# Patient Record
Sex: MALE | Race: WHITE | NOT HISPANIC OR LATINO | Employment: FULL TIME | URBAN - METROPOLITAN AREA
[De-identification: names, ages, dates, MRNs, and addresses within clinical notes are randomized per-mention and may not be internally consistent; named-entity substitution may affect disease eponyms.]

---

## 2017-12-28 ENCOUNTER — OFFICE VISIT (OUTPATIENT)
Dept: URGENT CARE | Facility: URGENT CARE | Age: 27
End: 2017-12-28
Payer: COMMERCIAL

## 2017-12-28 VITALS
HEART RATE: 51 BPM | WEIGHT: 170 LBS | TEMPERATURE: 97.1 F | OXYGEN SATURATION: 100 % | BODY MASS INDEX: 24.34 KG/M2 | HEIGHT: 70 IN | DIASTOLIC BLOOD PRESSURE: 71 MMHG | SYSTOLIC BLOOD PRESSURE: 117 MMHG

## 2017-12-28 DIAGNOSIS — L60.0 INGROWN TOENAIL WITHOUT INFECTION: Primary | ICD-10-CM

## 2017-12-28 PROCEDURE — 99203 OFFICE O/P NEW LOW 30 MIN: CPT | Performed by: NURSE PRACTITIONER

## 2017-12-28 NOTE — MR AVS SNAPSHOT
After Visit Summary   12/28/2017    Craig Gusman    MRN: [id number]           Patient Information     Date Of Birth          1990        Visit Information        Provider Department      12/28/2017 6:15 PM Liliane Gonzalez NP [facility]        Today's Diagnoses     Ingrown toenail without infection    -  1      Care Instructions      Ingrown Toenail, Not Infected (Home Treatment)  An ingrown toenail occurs when the nail grows sideways into the skin next to the nail. This can cause pain, especially when wearing tight shoes. It can also lead to an infection with redness, swelling, and pus drainage. Most people respond to the treatments described here. But sometimes surgery is needed. The big toe is most often affected.   The most common cause of an ingrown toenail is trimming your nails wrong. Most people trim the nails too close to the skin and try to round the nail too tightly around the shape of the toe. When you do this, the nail can grow into the skin of your toe. It is safer to trim the nail ending in a straight line rather than a curve.  Home care  The following guidelines will help you care for your toenail at home:    Soak the painful toe in warm water 3 to 4 times each day, for 10 to 20 minutes each time. Adding Epsom salt may be recommended by your healthcare provider. Wash the entire foot with an antibacterial soap. Then keep it dry.    If there is redness or swelling around the toenail, apply an antibiotic ointment 3 times a day.    Insert a small piece of rolled-up cotton under the corner of the nail. This helps the nail to grow outward, away from the cuticle.    Wear shoes that don t put pressure on the toes, such as a sandal or open shoe. Closed shoes should be big enough in the toes so that there is no pressure on the painful toe.    You may use acetaminophen or ibuprofen for pain, unless another pain medicine was prescribed. Talk with your  healthcare provider before using these medicines if you have chronic liver or kidney disease. Also tell your provider if you have ever had a stomach ulcer or GI (gastrointestinal) bleeding.  Prevention  The following tips will help you prevent ingrown toenails:    Avoid pointed, tight, or narrow shoes.    Trim toenails once a month so they don t grow too long. Cut the nail straight across.  Follow-up care  Follow up with your healthcare provider, or as advised.  When to seek medical advice  Call your healthcare provider right away if any of these occur:    Increasing redness, pain, or swelling of the toe    Tender red streaks in the skin leading toward the ankle    Pus or fluid drainage from the toe    Fever of 100.4 F (38 C) or higher, or as directed by your provider  Date Last Reviewed: 4/1/2017 2000-2017 [employer]. [address] [place] [address]. All rights reserved. This information is not intended as a substitute for professional medical care. Always follow your healthcare professional's instructions.                Follow-ups after your visit        Who to contact     If you have questions or need follow up information about today's clinic visit or your schedule please contact [facility] directly at [phone].  Normal or non-critical lab and imaging results will be communicated to you by Accruithart, letter or phone within 4 business days after the clinic has received the results. If you do not hear from us within 7 days, please contact the clinic through [employer] or phone. If you have a critical or abnormal lab result, we will notify you by phone as soon as possible.  Submit refill requests through [employer]ueAgent or call your pharmacy and they will forward the refill request to us. Please allow 3 business days for your refill to be completed.          Additional Information About Your Visit        VenueAgent Information     VenueAgent lets you send messages to your  "doctor, view your test results, renew your prescriptions, schedule appointments and more. To sign up, go to [email or web address] . Click on \"Log in\" on the left side of the screen, which will take you to the Welcome page. Then click on \"Sign up Now\" on the right side of the page.     You will be asked to enter the access code listed below, as well as some personal information. Please follow the directions to create your username and password.     Your access code is: [email or web address]  Expires: 3/28/2018  6:38 PM     Your access code will  in 90 days. If you need help or a new code, please call your [facility] clinic or [phone].        Care EveryWhere ID     This is your Care EveryWhere ID. This could be used by other organizations to access your [facility] medical records  [id number]        Your Vitals Were     Pulse Temperature Height Pulse Oximetry BMI (Body Mass Index)       51 97.1  F (36.2  C) (Tympanic) 5' 10\" (1.778 m) 100% 24.39 kg/m2        Blood Pressure from Last 3 Encounters:   17 117/71    Weight from Last 3 Encounters:   17 170 lb (77.1 kg)              Today, you had the following     No orders found for display       Primary Care Provider Fax #    Physician No Ref-Primary [phone]       No address on file        Equal Access to Services     [facility]: Hadii savannah ku hadasho Soomaali, waaxda luqadaha, qaybta kaalmada adeegyada, jose ivey . So [facility] [phone].    ATENCIÓN: Si habla español, tiene a villa disposición servicios gratuitos de asistencia lingüística. Llame al [phone].    We comply with applicable federal civil rights laws and [place] laws. We do not discriminate on the basis of race, color, national origin, age, disability, sex, sexual orientation, or gender identity.            Thank you!     Thank you for choosing [facility]  for your care. Our goal is always to provide you with excellent care. Hearing " back from our patients is one way we can continue to improve our services. Please take a few minutes to complete the written survey that you may receive in the mail after your visit with us. Thank you!             Your Updated Medication List - Protect others around you: Learn how to safely use, store and throw away your medicines at www.disposemymeds.org.      Notice  As of 12/28/2017  6:38 PM    You have not been prescribed any medications.

## 2017-12-29 NOTE — PROGRESS NOTES
"  SUBJECTIVE:   Craig Gusman is a 27 year old male who presents to clinic today for the following health issues:    Ingrowing toenail      Duration: 2-3 weeks    Description (location/character/radiation): right great toe    Intensity:  moderate    Accompanying signs and symptoms: pain, swelling, redness    History (similar episodes/previous evaluation): None    Precipitating or alleviating factors: hurts to wear shoes and walking    Therapies tried and outcome: has improved with applying Neosporin, but still a little red and painful. No drainage.       Problem list and histories reviewed & adjusted, as indicated.  Additional history: as documented    There is no problem list on file for this patient.    History reviewed. No pertinent surgical history.    Social History   Substance Use Topics     Smoking status: Never Smoker     Smokeless tobacco: Never Used     Alcohol use Not on file     History reviewed. No pertinent family history.      No current outpatient prescriptions on file.     No Known Allergies      Reviewed and updated as needed this visit by clinical staffTobacco  Allergies  Meds  Soc Hx      Reviewed and updated as needed this visit by Provider  Tobacco  Allergies  Meds  Med Hx  Surg Hx  Fam Hx  Soc Hx        ROS:  Constitutional, HEENT, cardiovascular, pulmonary, gi and gu systems are negative, except as otherwise noted.      OBJECTIVE:   /71  Pulse 51  Temp 97.1  F (36.2  C) (Tympanic)  Ht 5' 10\" (1.778 m)  Wt 170 lb (77.1 kg)  SpO2 100%  BMI 24.39 kg/m2  Body mass index is 24.39 kg/(m^2).  Physical Exam   Constitutional: No distress.   Musculoskeletal:        Feet:    Psychiatric: He has a normal mood and affect. His behavior is normal.         Diagnostic Test Results:  none     ASSESSMENT/PLAN:     ASSESSMENT:  Ingrown nail    PLAN:  Soak tid.  Bacitracin to affected area tid.  If any worsening sx, fever, etc. should be seen.  Allow nail to grow out and cut straight " across.    Liliane Gonzalez, MALLORY  [facility]

## 2017-12-29 NOTE — PATIENT INSTRUCTIONS
Ingrown Toenail, Not Infected (Home Treatment)  An ingrown toenail occurs when the nail grows sideways into the skin next to the nail. This can cause pain, especially when wearing tight shoes. It can also lead to an infection with redness, swelling, and pus drainage. Most people respond to the treatments described here. But sometimes surgery is needed. The big toe is most often affected.   The most common cause of an ingrown toenail is trimming your nails wrong. Most people trim the nails too close to the skin and try to round the nail too tightly around the shape of the toe. When you do this, the nail can grow into the skin of your toe. It is safer to trim the nail ending in a straight line rather than a curve.  Home care  The following guidelines will help you care for your toenail at home:    Soak the painful toe in warm water 3 to 4 times each day, for 10 to 20 minutes each time. Adding Epsom salt may be recommended by your healthcare provider. Wash the entire foot with an antibacterial soap. Then keep it dry.    If there is redness or swelling around the toenail, apply an antibiotic ointment 3 times a day.    Insert a small piece of rolled-up cotton under the corner of the nail. This helps the nail to grow outward, away from the cuticle.    Wear shoes that don t put pressure on the toes, such as a sandal or open shoe. Closed shoes should be big enough in the toes so that there is no pressure on the painful toe.    You may use acetaminophen or ibuprofen for pain, unless another pain medicine was prescribed. Talk with your healthcare provider before using these medicines if you have chronic liver or kidney disease. Also tell your provider if you have ever had a stomach ulcer or GI (gastrointestinal) bleeding.  Prevention  The following tips will help you prevent ingrown toenails:    Avoid pointed, tight, or narrow shoes.    Trim toenails once a month so they don t grow too long. Cut the nail straight  across.  Follow-up care  Follow up with your healthcare provider, or as advised.  When to seek medical advice  Call your healthcare provider right away if any of these occur:    Increasing redness, pain, or swelling of the toe    Tender red streaks in the skin leading toward the ankle    Pus or fluid drainage from the toe    Fever of 100.4 F (38 C) or higher, or as directed by your provider  Date Last Reviewed: 4/1/2017 2000-2017 [employer]. [address] [place], [place] [address]. All rights reserved. This information is not intended as a substitute for professional medical care. Always follow your healthcare professional's instructions.

## 2017-12-29 NOTE — NURSING NOTE
"Chief Complaint   Patient presents with     Urgent Care     Ingrown Toenail     c/o ingrown toenail for 2 weeks       Initial /71  Pulse 51  Temp 97.1  F (36.2  C) (Tympanic)  Ht 5' 10\" (1.778 m)  Wt 170 lb (77.1 kg)  SpO2 100%  BMI 24.39 kg/m2 Estimated body mass index is 24.39 kg/(m^2) as calculated from the following:    Height as of this encounter: 5' 10\" (1.778 m).    Weight as of this encounter: 170 lb (77.1 kg).  Medication Reconciliation: complete   Shanda Etienne MA    "

## 2019-05-15 ENCOUNTER — OFFICE VISIT (OUTPATIENT)
Dept: FAMILY MEDICINE | Facility: CLINIC | Age: 29
End: 2019-05-15
Payer: COMMERCIAL

## 2019-05-15 VITALS
BODY MASS INDEX: 23.19 KG/M2 | OXYGEN SATURATION: 99 % | DIASTOLIC BLOOD PRESSURE: 77 MMHG | SYSTOLIC BLOOD PRESSURE: 122 MMHG | TEMPERATURE: 98.4 F | HEIGHT: 70 IN | HEART RATE: 47 BPM | RESPIRATION RATE: 20 BRPM | WEIGHT: 162 LBS

## 2019-05-15 DIAGNOSIS — M23.8X1 CREPITUS OF JOINT OF RIGHT KNEE: ICD-10-CM

## 2019-05-15 DIAGNOSIS — R41.840 ATTENTION AND CONCENTRATION DEFICIT: Primary | ICD-10-CM

## 2019-05-15 PROCEDURE — 99213 OFFICE O/P EST LOW 20 MIN: CPT | Performed by: FAMILY MEDICINE

## 2019-05-15 RX ORDER — LORATADINE 10 MG/1
10 TABLET ORAL DAILY PRN
COMMUNITY

## 2019-05-15 ASSESSMENT — ANXIETY QUESTIONNAIRES
1. FEELING NERVOUS, ANXIOUS, OR ON EDGE: SEVERAL DAYS
GAD7 TOTAL SCORE: 9
6. BECOMING EASILY ANNOYED OR IRRITABLE: SEVERAL DAYS
5. BEING SO RESTLESS THAT IT IS HARD TO SIT STILL: NEARLY EVERY DAY
7. FEELING AFRAID AS IF SOMETHING AWFUL MIGHT HAPPEN: SEVERAL DAYS
2. NOT BEING ABLE TO STOP OR CONTROL WORRYING: SEVERAL DAYS
3. WORRYING TOO MUCH ABOUT DIFFERENT THINGS: SEVERAL DAYS

## 2019-05-15 ASSESSMENT — MIFFLIN-ST. JEOR: SCORE: 1711.08

## 2019-05-15 ASSESSMENT — PATIENT HEALTH QUESTIONNAIRE - PHQ9
SUM OF ALL RESPONSES TO PHQ QUESTIONS 1-9: 12
5. POOR APPETITE OR OVEREATING: SEVERAL DAYS

## 2019-05-15 NOTE — PROGRESS NOTES
SUBJECTIVE:   Craig Gusman is a 28 year old male who presents to clinic today for the following health issues:      HPI     Presents to [facility] care.    Current Chronic Medical Conditions  None    Social History  [employer]   Works as -- [employer].  [employer].  Recently .  Wife is a [employer] .  No kids.    Would like further assessment for possible ADHD/ADD symptoms.  Never was tested as kid but people would sometimes joke with him.  Cannot focus to read more than 2-3 pages at a time.  Sometimes talks too fast and interrupts others when speaking.  Feels he cannot focus on tasks at hand.    He also is concerned about crunching noise in RIGHT knee with certain movements.  No pain associated with sound.  No restriction, locking or popping of knee.  No swelling.      Additional history: as documented    Reviewed and updated as needed this visit by clinical staff  Tobacco  Allergies  Meds  Problems  Med Hx  Surg Hx  Fam Hx  Soc Hx          Reviewed and updated as needed this visit by Provider  Tobacco  Allergies  Meds  Problems  Med Hx  Surg Hx  Fam Hx             There is no problem list on file for this patient.    History reviewed. No pertinent surgical history.    Social History     Tobacco Use     Smoking status: Never Smoker     Smokeless tobacco: Current User     Types: Chew   Substance Use Topics     Alcohol use: Yes     Comment: couple beers per week     Family History   Problem Relation Age of Onset     Hyperlipidemia Father          Current Outpatient Medications   Medication Sig Dispense Refill     loratadine (CLARITIN) 10 MG tablet Take 10 mg by mouth daily as needed for allergies       No Known Allergies  No lab results found.   BP Readings from Last 3 Encounters:   05/15/19 122/77   12/28/17 117/71    Wt Readings from Last 3 Encounters:   05/15/19 73.5 kg (162 lb)   12/28/17 77.1 kg (170 lb)                    ROS:  Constitutional, HEENT,  "cardiovascular, pulmonary, gi and gu systems are negative, except as otherwise noted.    OBJECTIVE:     /77   Pulse (!) 47   Temp 98.4  F (36.9  C) (Oral)   Resp 20   Ht 1.778 m (5' 10\")   Wt 73.5 kg (162 lb)   SpO2 99%   BMI 23.24 kg/m    Body mass index is 23.24 kg/m .  GENERAL: healthy, alert and no distress  EYES: Eyes grossly normal to inspection, PERRL and conjunctivae and sclerae normal  NECK: no adenopathy, no asymmetry, masses, or scars and thyroid normal to palpation  MS: no gross musculoskeletal defects noted, no edema, + crepitus with squats of RIGHT knee only, structurally normal  SKIN: no suspicious lesions or rashes  NEURO: Normal strength and tone, mentation intact and speech normal  PSYCH: mentation appears normal, affect normal/bright    ASSESSMENT/PLAN:     1. Attention and concentration deficit    Referred to [facility] or [facility] for adult ADHD assessment.  Once this is completed, he is encouraged to return to discuss treatment options if he does qualify for diagnosis.    2. Crepitus of joint of right knee    Reassured of benign nature of crepitus when it is not associated with pain or ROM issues.  No restrictions for exercise.      Leny Aguilar MD  [facility]  "

## 2019-05-16 ASSESSMENT — ANXIETY QUESTIONNAIRES: GAD7 TOTAL SCORE: 9

## 2019-05-31 ENCOUNTER — OFFICE VISIT (OUTPATIENT)
Dept: URGENT CARE | Facility: URGENT CARE | Age: 29
End: 2019-05-31
Payer: COMMERCIAL

## 2019-05-31 VITALS
HEART RATE: 50 BPM | BODY MASS INDEX: 22.9 KG/M2 | DIASTOLIC BLOOD PRESSURE: 70 MMHG | TEMPERATURE: 98.2 F | HEIGHT: 70 IN | WEIGHT: 160 LBS | SYSTOLIC BLOOD PRESSURE: 110 MMHG | RESPIRATION RATE: 12 BRPM

## 2019-05-31 DIAGNOSIS — R21 RASH AND NONSPECIFIC SKIN ERUPTION: Primary | ICD-10-CM

## 2019-05-31 DIAGNOSIS — L23.7 CONTACT DERMATITIS DUE TO POISON IVY: ICD-10-CM

## 2019-05-31 PROCEDURE — 99213 OFFICE O/P EST LOW 20 MIN: CPT | Performed by: INTERNAL MEDICINE

## 2019-05-31 RX ORDER — PREDNISONE 20 MG/1
TABLET ORAL
Qty: 20 TABLET | Refills: 0 | Status: SHIPPED | OUTPATIENT
Start: 2019-05-31 | End: 2020-10-20

## 2019-05-31 ASSESSMENT — MIFFLIN-ST. JEOR: SCORE: 1702.01

## 2019-05-31 NOTE — PROGRESS NOTES
"SUBJECTIVE:   Craig Gusman is a 28 year old male presenting with a chief complaint of   Chief Complaint   Patient presents with     Urgent Care     Derm Problem     Rash on arms legs and feet. itchy       He is an established patient of [facility].    Rash    Onset of rash was 5 day(s) ago.   Course of illness is worsening.    Current and Associated symptoms: itching and skin color bumps   Location of the rash: arm, lower and lower legs/feet.  Previous history of a similar rash? No  Recent exposure history: tubing in [place]  Denies exposure to: dietary change, environmental allergens, medications, new household products and new skincare products  Associated symptoms include: nothing.  Treatment measures tried include: caladryl        Review of Systems    No past medical history on file.  Family History   Problem Relation Age of Onset     Hyperlipidemia Father      Current Outpatient Medications   Medication Sig Dispense Refill     predniSONE (DELTASONE) 20 MG tablet Take 3 tabs by mouth daily x 3 days, then 2 tabs daily x 3 days, then 1 tab daily x 3 days, then 1/2 tab daily x 3 days. 20 tablet 0     loratadine (CLARITIN) 10 MG tablet Take 10 mg by mouth daily as needed for allergies       Social History     Tobacco Use     Smoking status: Never Smoker     Smokeless tobacco: Current User     Types: Chew   Substance Use Topics     Alcohol use: Yes     Comment: couple beers per week       OBJECTIVE  /70   Pulse 50   Temp 98.2  F (36.8  C) (Oral)   Resp 12   Ht 1.778 m (5' 10\")   Wt 72.6 kg (160 lb)   BMI 22.96 kg/m      Physical Exam   Constitutional: He appears well-developed and well-nourished.   Skin:   Left upper extremity patient hasRed papules some in linear distribution with potentially early blister development    Bilateral feet with skin colored papules.  Some seem to be developing a few blisters.    With closer examination of rash on calves there are red papules with some early blister with " linear involvement.    Also scabbed areas from excoriations from itching.     Vitals reviewed.      Labs:  No results found for this or any previous visit (from the past 24 hour(s)).        ASSESSMENT:      ICD-10-CM    1. Rash and nonspecific skin eruption R21 predniSONE (DELTASONE) 20 MG tablet   2. Contact dermatitis due to poison ivy L23.7 predniSONE (DELTASONE) 20 MG tablet        Medical Decision Making:  Most likely exposure was during to being down a river in [place].  Rash reminiscent of contact dermatitis due to plant like poison ivy.    Potentially could be bug bites although there are a lot of papules so doubt that.  Potentially something else like triggers.    Treatment antihistamines and steroid taper.  Handout given below for also other comfort cares      Patient Instructions     Rash   Could be poison ivy - as linear in some places & starting to blister.  Bug bites -like chiggers.    Antihistamine  claritin morning  Benadryl 50 mg night  Prednisone taper.          Patient Education     Avoiding Poison Ivy, Poison Oak, and Poison Sumac  Poison oak, poison ivy, and poison sumac are plants that can cause skin rashes. The problem is a sap oil called urushiol that is contained in these plants. If you're allergic to urushiol, touching one of these plants may cause your skin to react. Within hours or days, you may have a red, swollen, itchy rash. You can't stop the rash. But you can soothe the itching.        Recognizing these plants  You can help prevent a poison oak, poison ivy, or poison sumac rash. Know what these plants look like. And then avoid them. They grow in the form of bong, small plants, and large bushes. In most cases, poison oak and poison ivy have 3 leaves per stem. Poison sumac has from 7 leaves to 13 leaves per stem. Watch out for these plants when you go to any outdoor area, from a friend's overgrown back yard to the wilderness. Urushiol is present in these plants all year round, even when  the leaves are gone. So always be on the lookout.  What causes a reaction?  Poison oak, poison ivy, and poison sumac thrive mainly in unmaintained outdoor areas. If you touch these plants, you may get a rash. You may also react if you touch something that came in contact with urushiol. This could be a dog or cat, clothing, or equipment. But the rash caused by these plants is not contagious.  Steps to prevention  When heading outdoors, take these preventive steps:    Avoid touching any of these plants.    Wear long pants and a long-sleeved shirt.    If you're going to a heavily wooded or brushy area, also put on gloves, a hat, and boots.    If you are very sensitive, apply bentoquatam 5% topical cream to all exposed areas of your skin. This creates a layer of protection between your skin and any sap oil you may touch.    If you come in contact with these plants or the oil, wash with soap and water as soon as possible.    Wash clothing and animals that come in contact with these plants as well. Urushiol may stay on them and cause a rash when you touch them in the future.  Date Last Reviewed: 3/1/2017    5230-6538 [employer]. [address] [place] [address], [place] [address]. All rights reserved. This information is not intended as a substitute for professional medical care. Always follow your healthcare professional's instructions.           Patient Education     Managing a Poison Ivy, Poison Oak, or Poison Sumac Reaction  If you come in contact with urushiol    If you think you may have come in contact with the sap oil (called urushiol) contained in poison ivy, poison oak, or poison sumac plants, wash the affected part of your skin. Do this within 15 minutes after contact. Use water or preferably, soap and water.  Undress, and wash your clothes and gear as soon as you can. Be sure to wash any pet that was with you. Taking these steps can help prevent spreading sap oil to someone else. If you have a rash, but  are not sure if it is from one of these plants, see your healthcare provider.  To soothe the itching  Your skin may react to poison oak, poison ivy, and poison sumac within hours to a few days after contact. Once you have come into contact with these plants, you can t stop the reaction. But you can take these steps to soothe the itching:    Don t scratch or scrub your rash. Not even if the itching is severe. Scratching can lead to infection.    Bathe in lukewarm (not hot) water. Or take short cool showers to relieve the itching. For a more soothing bath, add oatmeal to the water.    Use antihistamines that are taken by mouth. These include diphenhydramine. You can buy these at the pharmacy. Talk to your healthcare provider or pharmacist for more information on oral antihistamines.    Use over-the-counter treatments on your skin. These include cortisone and calamine lotion.  How your skin may react  A mild rash may become red, swollen, and itchy. The rash may form a line on your skin where you brushed against the plant. If you have a severe rash, your itching may worsen. And your rash may blister and ooze. If this happens, seek medical care. The fluid from your blisters will not make your rash spread. With or without medical care, your rash may last up to 3 weeks. In the future, try to avoid coming in contact with these plants.  When to call your healthcare provider  Call your healthcare provider if:    Your rash is severe    The rash spreads beyond the exposed part of your body or affects your face.    The rash does not clear up within a few weeks  You may be given medicine to take by mouth or apply directly on the skin.     Call 911  Call 911 if you have any of the following:    Trouble breathing or swallowing    Any significant swelling   Date Last Reviewed: 3/1/2017    9864-3485 [employer]. [address] [place] [address], [place] [address]. All rights reserved. This information is not intended as a  substitute for professional medical care. Always follow your healthcare professional's instructions.

## 2019-05-31 NOTE — PATIENT INSTRUCTIONS
Rash   Could be poison ivy - as linear in some places & starting to blister.  Bug bites -like chiggers.    Antihistamine  claritin morning  Benadryl 50 mg night  Prednisone taper.          Patient Education     Avoiding Poison Ivy, Poison Oak, and Poison Sumac  Poison oak, poison ivy, and poison sumac are plants that can cause skin rashes. The problem is a sap oil called urushiol that is contained in these plants. If you're allergic to urushiol, touching one of these plants may cause your skin to react. Within hours or days, you may have a red, swollen, itchy rash. You can't stop the rash. But you can soothe the itching.        Recognizing these plants  You can help prevent a poison oak, poison ivy, or poison sumac rash. Know what these plants look like. And then avoid them. They grow in the form of bong, small plants, and large bushes. In most cases, poison oak and poison ivy have 3 leaves per stem. Poison sumac has from 7 leaves to 13 leaves per stem. Watch out for these plants when you go to any outdoor area, from a friend's overgrown back yard to the [facility]. Urushiol is present in these plants all year round, even when the leaves are gone. So always be on the lookout.  What causes a reaction?  Poison oak, poison ivy, and poison sumac thrive mainly in unmaintained outdoor areas. If you touch these plants, you may get a rash. You may also react if you touch something that came in contact with urushiol. This could be a dog or cat, clothing, or equipment. But the rash caused by these plants is not contagious.  Steps to prevention  When heading outdoors, take these preventive steps:    Avoid touching any of these plants.    Wear long pants and a long-sleeved shirt.    If you're going to a heavily wooded or brushy area, also put on gloves, a hat, and boots.    If you are very sensitive, apply bentoquatam 5% topical cream to all exposed areas of your skin. This creates a layer of protection between your skin and  any sap oil you may touch.    If you come in contact with these plants or the oil, wash with soap and water as soon as possible.    Wash clothing and animals that come in contact with these plants as well. Urushiol may stay on them and cause a rash when you touch them in the future.  Date Last Reviewed: 3/1/2017    2233-6699 [employer]. [address] [place] [address], [place] [address]. All rights reserved. This information is not intended as a substitute for professional medical care. Always follow your healthcare professional's instructions.           Patient Education     Managing a Poison Ivy, Poison Oak, or Poison Sumac Reaction  If you come in contact with urushiol    If you think you may have come in contact with the sap oil (called urushiol) contained in poison ivy, poison oak, or poison sumac plants, wash the affected part of your skin. Do this within 15 minutes after contact. Use water or preferably, soap and water.  Undress, and wash your clothes and gear as soon as you can. Be sure to wash any pet that was with you. Taking these steps can help prevent spreading sap oil to someone else. If you have a rash, but are not sure if it is from one of these plants, see your healthcare provider.  To soothe the itching  Your skin may react to poison oak, poison ivy, and poison sumac within hours to a few days after contact. Once you have come into contact with these plants, you can t stop the reaction. But you can take these steps to soothe the itching:    Don t scratch or scrub your rash. Not even if the itching is severe. Scratching can lead to infection.    Bathe in lukewarm (not hot) water. Or take short cool showers to relieve the itching. For a more soothing bath, add oatmeal to the water.    Use antihistamines that are taken by mouth. These include diphenhydramine. You can buy these at the pharmacy. Talk to your healthcare provider or pharmacist for more information on oral antihistamines.    Use  over-the-counter treatments on your skin. These include cortisone and calamine lotion.  How your skin may react  A mild rash may become red, swollen, and itchy. The rash may form a line on your skin where you brushed against the plant. If you have a severe rash, your itching may worsen. And your rash may blister and ooze. If this happens, seek medical care. The fluid from your blisters will not make your rash spread. With or without medical care, your rash may last up to 3 weeks. In the future, try to avoid coming in contact with these plants.  When to call your healthcare provider  Call your healthcare provider if:    Your rash is severe    The rash spreads beyond the exposed part of your body or affects your face.    The rash does not clear up within a few weeks  You may be given medicine to take by mouth or apply directly on the skin.     Call 911  Call 911 if you have any of the following:    Trouble breathing or swallowing    Any significant swelling   Date Last Reviewed: 3/1/2017    4275-4555 [employer] [address] [place] [address], [place] [address]. All rights reserved. This information is not intended as a substitute for professional medical care. Always follow your healthcare professional's instructions.

## 2020-03-11 ENCOUNTER — HEALTH MAINTENANCE LETTER (OUTPATIENT)
Age: 30
End: 2020-03-11

## 2020-10-19 ASSESSMENT — ENCOUNTER SYMPTOMS
ABDOMINAL PAIN: 0
ARTHRALGIAS: 0
CHILLS: 0
FEVER: 0
WEAKNESS: 0
SORE THROAT: 0
HEMATURIA: 0
FREQUENCY: 0
HEARTBURN: 0
MYALGIAS: 0
COUGH: 0
DYSURIA: 0
CONSTIPATION: 0
PALPITATIONS: 0
NERVOUS/ANXIOUS: 1
HEADACHES: 0
HEMATOCHEZIA: 0
JOINT SWELLING: 0
PARESTHESIAS: 0
NAUSEA: 0
SHORTNESS OF BREATH: 0
DIZZINESS: 0
DIARRHEA: 0
EYE PAIN: 0

## 2020-10-20 ENCOUNTER — OFFICE VISIT (OUTPATIENT)
Dept: FAMILY MEDICINE | Facility: CLINIC | Age: 30
End: 2020-10-20
Payer: COMMERCIAL

## 2020-10-20 VITALS
BODY MASS INDEX: 20.9 KG/M2 | RESPIRATION RATE: 16 BRPM | HEIGHT: 70 IN | SYSTOLIC BLOOD PRESSURE: 112 MMHG | TEMPERATURE: 98.4 F | HEART RATE: 100 BPM | DIASTOLIC BLOOD PRESSURE: 60 MMHG | WEIGHT: 146 LBS | OXYGEN SATURATION: 100 %

## 2020-10-20 DIAGNOSIS — Z00.00 ROUTINE GENERAL MEDICAL EXAMINATION AT A HEALTH CARE FACILITY: Primary | ICD-10-CM

## 2020-10-20 DIAGNOSIS — Z13.1 SCREENING FOR DIABETES MELLITUS: ICD-10-CM

## 2020-10-20 DIAGNOSIS — Z13.6 SCREENING FOR CARDIOVASCULAR CONDITION: ICD-10-CM

## 2020-10-20 DIAGNOSIS — Z11.4 SCREENING FOR HIV (HUMAN IMMUNODEFICIENCY VIRUS): ICD-10-CM

## 2020-10-20 DIAGNOSIS — Z23 NEED FOR PROPHYLACTIC VACCINATION AND INOCULATION AGAINST INFLUENZA: ICD-10-CM

## 2020-10-20 DIAGNOSIS — Z72.0 CHEWS TOBACCO: ICD-10-CM

## 2020-10-20 LAB — HIV 1+2 AB+HIV1 P24 AG SERPL QL IA: NONREACTIVE

## 2020-10-20 PROCEDURE — 99395 PREV VISIT EST AGE 18-39: CPT | Mod: 25 | Performed by: FAMILY MEDICINE

## 2020-10-20 PROCEDURE — 82947 ASSAY GLUCOSE BLOOD QUANT: CPT | Performed by: FAMILY MEDICINE

## 2020-10-20 PROCEDURE — 87389 HIV-1 AG W/HIV-1&-2 AB AG IA: CPT | Performed by: FAMILY MEDICINE

## 2020-10-20 PROCEDURE — 36415 COLL VENOUS BLD VENIPUNCTURE: CPT | Performed by: FAMILY MEDICINE

## 2020-10-20 PROCEDURE — 90471 IMMUNIZATION ADMIN: CPT | Performed by: FAMILY MEDICINE

## 2020-10-20 PROCEDURE — 80061 LIPID PANEL: CPT | Performed by: FAMILY MEDICINE

## 2020-10-20 PROCEDURE — 90686 IIV4 VACC NO PRSV 0.5 ML IM: CPT | Performed by: FAMILY MEDICINE

## 2020-10-20 RX ORDER — DEXTROAMPHETAMINE SACCHARATE, AMPHETAMINE ASPARTATE MONOHYDRATE, DEXTROAMPHETAMINE SULFATE AND AMPHETAMINE SULFATE 3.75; 3.75; 3.75; 3.75 MG/1; MG/1; MG/1; MG/1
25 CAPSULE, EXTENDED RELEASE ORAL DAILY
COMMUNITY
Start: 2020-10-19

## 2020-10-20 ASSESSMENT — ENCOUNTER SYMPTOMS
FEVER: 0
NAUSEA: 0
NERVOUS/ANXIOUS: 1
HEADACHES: 0
PALPITATIONS: 0
EYE PAIN: 0
SORE THROAT: 0
MYALGIAS: 0
SHORTNESS OF BREATH: 0
ARTHRALGIAS: 0
DYSURIA: 0
DIZZINESS: 0
ABDOMINAL PAIN: 0
JOINT SWELLING: 0
HEARTBURN: 0
HEMATOCHEZIA: 0
PARESTHESIAS: 0
CONSTIPATION: 0
HEMATURIA: 0
CHILLS: 0
DIARRHEA: 0
FREQUENCY: 0
COUGH: 0
WEAKNESS: 0

## 2020-10-20 ASSESSMENT — ANXIETY QUESTIONNAIRES
7. FEELING AFRAID AS IF SOMETHING AWFUL MIGHT HAPPEN: SEVERAL DAYS
GAD7 TOTAL SCORE: 9
3. WORRYING TOO MUCH ABOUT DIFFERENT THINGS: MORE THAN HALF THE DAYS
5. BEING SO RESTLESS THAT IT IS HARD TO SIT STILL: NOT AT ALL
GAD7 TOTAL SCORE: 9
6. BECOMING EASILY ANNOYED OR IRRITABLE: MORE THAN HALF THE DAYS
2. NOT BEING ABLE TO STOP OR CONTROL WORRYING: SEVERAL DAYS
7. FEELING AFRAID AS IF SOMETHING AWFUL MIGHT HAPPEN: SEVERAL DAYS
1. FEELING NERVOUS, ANXIOUS, OR ON EDGE: MORE THAN HALF THE DAYS
GAD7 TOTAL SCORE: 9
4. TROUBLE RELAXING: SEVERAL DAYS

## 2020-10-20 ASSESSMENT — PATIENT HEALTH QUESTIONNAIRE - PHQ9
10. IF YOU CHECKED OFF ANY PROBLEMS, HOW DIFFICULT HAVE THESE PROBLEMS MADE IT FOR YOU TO DO YOUR WORK, TAKE CARE OF THINGS AT HOME, OR GET ALONG WITH OTHER PEOPLE: SOMEWHAT DIFFICULT
SUM OF ALL RESPONSES TO PHQ QUESTIONS 1-9: 13
SUM OF ALL RESPONSES TO PHQ QUESTIONS 1-9: 13

## 2020-10-20 ASSESSMENT — MIFFLIN-ST. JEOR: SCORE: 1628.5

## 2020-10-20 NOTE — PROGRESS NOTES
SUBJECTIVE:   CC: Craig Gusman is an 30 year old male who presents for preventative health visit.       Patient has been advised of split billing requirements and indicates understanding: No  Healthy Habits:     Getting at least 3 servings of Calcium per day:  NO    Bi-annual eye exam:  NO    Dental care twice a year:  NO    Sleep apnea or symptoms of sleep apnea:  None    Diet:  Regular (no restrictions)    Frequency of exercise:  1 day/week    Duration of exercise:  15-30 minutes    Taking medications regularly:  Yes    Medication side effects:  Other    PHQ-2 Total Score: 2    Additional concerns today:  Yes (check out mole on left arm )    Today's PHQ-2 Score:   PHQ-2 ( 1999 Pfizer) 10/19/2020   Q1: Little interest or pleasure in doing things 1   Q2: Feeling down, depressed or hopeless 1   PHQ-2 Score 2   Q1: Little interest or pleasure in doing things Several days   Q2: Feeling down, depressed or hopeless Several days   PHQ-2 Score 2     Abuse: Current or Past(Physical, Sexual or Emotional)- No  Do you feel safe in your environment? Yes    Social History     Tobacco Use     Smoking status: Never Smoker     Smokeless tobacco: Current User     Types: Chew   Substance Use Topics     Alcohol use: Yes     Comment: couple beers per week     If you drink alcohol do you typically have >3 drinks per day or >7 drinks per week? No    Alcohol Use 10/19/2020   Prescreen: >3 drinks/day or >7 drinks/week? No   No flowsheet data found.    Last PSA: No results found for: PSA    Reviewed orders with patient. Reviewed health maintenance and updated orders accordingly - Yes       Reviewed and updated as needed this visit by clinical staff    Reviewed and updated as needed this visit by Provider    Does not smoke but chew tobacco.     No suicidal thoughts but passive thoughts present.  Recently become unemployed.   Looking for Counsellor. No need for intervention at this point.   Started 15mg of adderall last year. Seeing a  "psychiatrist.   Lives with his wife who is studying to be a counselor.   Biking 2-3 times per week. Not as active.     Review of Systems   Constitutional: Negative for chills and fever.   HENT: Negative for congestion, ear pain, hearing loss and sore throat.    Eyes: Negative for pain and visual disturbance.   Respiratory: Negative for cough and shortness of breath.    Cardiovascular: Negative for chest pain, palpitations and peripheral edema.   Gastrointestinal: Negative for abdominal pain, constipation, diarrhea, heartburn, hematochezia and nausea.   Genitourinary: Negative for discharge, dysuria, frequency, genital sores, hematuria, impotence and urgency.   Musculoskeletal: Negative for arthralgias, joint swelling and myalgias.   Skin: Negative for rash.   Neurological: Negative for dizziness, weakness, headaches and paresthesias.   Psychiatric/Behavioral: Negative for mood changes. The patient is nervous/anxious.      OBJECTIVE:   /60 (BP Location: Left arm, Patient Position: Sitting, Cuff Size: Adult Regular)   Pulse 100   Temp 98.4  F (36.9  C) (Oral)   Resp 16   Ht 1.778 m (5' 10\")   Wt 66.2 kg (146 lb)   SpO2 100%   BMI 20.95 kg/m      Physical Exam  GENERAL: healthy, alert and no distress  EYES: Eyes grossly normal to inspection, PERRL and conjunctivae and sclerae normal  HENT: ear canals and TM's normal, nose and mouth without ulcers or lesions  NECK: no adenopathy, no asymmetry, masses, or scars and thyroid normal to palpation  RESP: lungs clear to auscultation - no rales, rhonchi or wheezes  CV: regular rate and rhythm, normal S1 S2, no S3 or S4, no murmur, click or rub, no peripheral edema and peripheral pulses strong  ABDOMEN: soft, nontender, no hepatosplenomegaly, no masses and bowel sounds normal   (male): normal male genitalia without lesions or urethral discharge, no hernia  MS: no gross musculoskeletal defects noted, no edema  SKIN: no suspicious lesions or rashes, small hemangioma " "on left deltoid area  NEURO: Normal strength and tone, mentation intact and speech normal  PSYCH: mentation appears normal, affect normal/bright    ASSESSMENT/PLAN:   1. Routine general medical examination at a health care facility  Patient's PHQ-9 score is high with passive suicidal ideation (better of dead thoughts on and off).  He is seeing a psychiatrist and he is going to talk to psychiatrist about medication.  Currently on ADHD medication but not on depression meds.  He denies self-harm thoughts, plan or intention.  He feels he is very stable and not interested in any intervention.  He feels comfortable.     2. Need for prophylactic vaccination and inoculation against influenza     - INFLUENZA VACCINE IM > 6 MONTHS VALENT IIV4 [30848]  - Vaccine Administration, Initial [65297]    3. Screening for HIV (human immunodeficiency virus)     - HIV Antigen Antibody Combo    4. Screening for diabetes mellitus     - Glucose    5. Screening for cardiovascular condition     - Lipid panel reflex to direct LDL Non-fasting    Patient has been advised of split billing requirements and indicates understanding: No  COUNSELING:   Reviewed preventive health counseling, as reflected in patient instructions  Special attention given to:        Regular exercise       Healthy diet/nutrition       Vision screening       Hearing screening    Estimated body mass index is 22.96 kg/m  as calculated from the following:    Height as of 5/31/19: 1.778 m (5' 10\").    Weight as of 5/31/19: 72.6 kg (160 lb).         He reports that he has never smoked. His smokeless tobacco use includes chew.  Tobacco Cessation Action Plan:   Self help information given to patient      Counseling Resources:  ATP IV Guidelines  Pooled Cohorts Equation Calculator  FRAX Risk Assessment  ICSI Preventive Guidelines  Dietary Guidelines for Americans, 2010  [employer]s MyPlate  ASA Prophylaxis  Lung CA Screening    Marshall Caldwell MD, MD  [facility] " EL LOMBARDI  Answers for HPI/ROS submitted by the patient on 10/20/2020   Annual Exam:  If you checked off any problems, how difficult have these problems made it for you to do your work, take care of things at home, or get along with other people?: Somewhat difficult  PHQ9 TOTAL SCORE: 13  MALVIN 7 TOTAL SCORE: 9

## 2020-10-21 LAB
CHOLEST SERPL-MCNC: 146 MG/DL
GLUCOSE SERPL-MCNC: 91 MG/DL (ref 70–99)
HDLC SERPL-MCNC: 47 MG/DL
LDLC SERPL CALC-MCNC: 70 MG/DL
NONHDLC SERPL-MCNC: 99 MG/DL
TRIGL SERPL-MCNC: 147 MG/DL

## 2020-10-21 ASSESSMENT — PATIENT HEALTH QUESTIONNAIRE - PHQ9: SUM OF ALL RESPONSES TO PHQ QUESTIONS 1-9: 13

## 2020-10-21 ASSESSMENT — ANXIETY QUESTIONNAIRES: GAD7 TOTAL SCORE: 9

## 2021-09-07 ENCOUNTER — OFFICE VISIT (OUTPATIENT)
Dept: FAMILY MEDICINE | Facility: CLINIC | Age: 31
End: 2021-09-07
Payer: COMMERCIAL

## 2021-09-07 VITALS
SYSTOLIC BLOOD PRESSURE: 120 MMHG | WEIGHT: 156 LBS | OXYGEN SATURATION: 100 % | BODY MASS INDEX: 21.84 KG/M2 | TEMPERATURE: 97.5 F | HEART RATE: 60 BPM | HEIGHT: 71 IN | DIASTOLIC BLOOD PRESSURE: 82 MMHG

## 2021-09-07 DIAGNOSIS — F90.2 ATTENTION DEFICIT HYPERACTIVITY DISORDER (ADHD), COMBINED TYPE: ICD-10-CM

## 2021-09-07 DIAGNOSIS — Z13.6 CARDIOVASCULAR SCREENING; LDL GOAL LESS THAN 130: ICD-10-CM

## 2021-09-07 DIAGNOSIS — Z00.00 ROUTINE GENERAL MEDICAL EXAMINATION AT A HEALTH CARE FACILITY: Primary | ICD-10-CM

## 2021-09-07 PROCEDURE — 80053 COMPREHEN METABOLIC PANEL: CPT | Performed by: FAMILY MEDICINE

## 2021-09-07 PROCEDURE — 80061 LIPID PANEL: CPT | Performed by: FAMILY MEDICINE

## 2021-09-07 PROCEDURE — 99395 PREV VISIT EST AGE 18-39: CPT | Performed by: FAMILY MEDICINE

## 2021-09-07 PROCEDURE — 36415 COLL VENOUS BLD VENIPUNCTURE: CPT | Performed by: FAMILY MEDICINE

## 2021-09-07 PROCEDURE — 84443 ASSAY THYROID STIM HORMONE: CPT | Performed by: FAMILY MEDICINE

## 2021-09-07 ASSESSMENT — MIFFLIN-ST. JEOR: SCORE: 1678.39

## 2021-09-07 NOTE — PROGRESS NOTES
SUBJECTIVE:   CC: Craig Gusman is an 31 year old male who presents for preventative health visit.       Patient has been advised of split billing requirements and indicates understanding: Yes  Healthy Habits:     Getting at least 3 servings of Calcium per day:  NO    Bi-annual eye exam:  NO    Dental care twice a year:  NO    Sleep apnea or symptoms of sleep apnea:  Daytime drowsiness    Diet:  Regular (no restrictions)    Frequency of exercise:  1 day/week    Duration of exercise:  15-30 minutes    Taking medications regularly:  Yes    Medication side effects:  Not applicable    PHQ-2 Total Score: 2    Additional concerns today:  No        Today's PHQ-2 Score:   PHQ-2 ( 1999 Pfizer) 9/5/2021   Q1: Little interest or pleasure in doing things 1   Q2: Feeling down, depressed or hopeless 1   PHQ-2 Score 2   Q1: Little interest or pleasure in doing things Several days   Q2: Feeling down, depressed or hopeless Several days   PHQ-2 Score 2       Abuse: Current or Past(Physical, Sexual or Emotional)- No  Do you feel safe in your environment? Yes    Have you ever done Advance Care Planning? (For example, a Health Directive, POLST, or a discussion with a medical provider or your loved ones about your wishes): No, advance care planning information given to patient to review.  Patient declined advance care planning discussion at this time.    Social History     Tobacco Use     Smoking status: Never Smoker     Smokeless tobacco: Current User     Types: Chew   Substance Use Topics     Alcohol use: Yes     Comment: couple beers per week     If you drink alcohol do you typically have >3 drinks per day or >7 drinks per week? No    Alcohol Use 9/5/2021   Prescreen: >3 drinks/day or >7 drinks/week? No   Prescreen: >3 drinks/day or >7 drinks/week? -   No flowsheet data found.    Last PSA: No results found for: PSA    Reviewed orders with patient. Reviewed health maintenance and updated orders accordingly - Yes  Lab work is in  "process    Reviewed and updated as needed this visit by clinical staff  Tobacco  Allergies  Meds              Reviewed and updated as needed this visit by Provider                No past medical history on file.     Review of Systems  CONSTITUTIONAL: NEGATIVE for fever, chills, change in weight  INTEGUMENTARY/SKIN: NEGATIVE for worrisome rashes, moles or lesions  EYES: NEGATIVE for vision changes or irritation  ENT: NEGATIVE for ear, mouth and throat problems  RESP: NEGATIVE for significant cough or SOB  CV: NEGATIVE for chest pain, palpitations or peripheral edema  GI: NEGATIVE for nausea, abdominal pain, heartburn, or change in bowel habits   male: negative for dysuria, hematuria, decreased urinary stream, erectile dysfunction, urethral discharge  MUSCULOSKELETAL: NEGATIVE for significant arthralgias or myalgia  NEURO: NEGATIVE for weakness, dizziness or paresthesias  PSYCHIATRIC: POSITIVE forconcentration difficulty and Hx anxiety and NEGATIVE foragitation, alcohol abuse, anhedonia, delusions and hopelessness    OBJECTIVE:   /82   Pulse 60   Temp 97.5  F (36.4  C) (Temporal)   Ht 1.793 m (5' 10.6\")   Wt 70.8 kg (156 lb)   SpO2 100%   BMI 22.00 kg/m      Physical Exam  GENERAL: healthy, alert and no distress  EYES: Eyes grossly normal to inspection, PERRL and conjunctivae and sclerae normal  HENT: ear canals and TM's normal, nose and mouth without ulcers or lesions  NECK: no adenopathy, no asymmetry, masses, or scars and thyroid normal to palpation  RESP: lungs clear to auscultation - no rales, rhonchi or wheezes  CV: regular rate and rhythm, normal S1 S2, no S3 or S4, no murmur, click or rub, no peripheral edema and peripheral pulses strong  ABDOMEN: soft, nontender, no hepatosplenomegaly, no masses and bowel sounds normal   (male): normal male genitalia without lesions or urethral discharge, no hernia  MS: no gross musculoskeletal defects noted, no edema  SKIN: no suspicious lesions or " "rashes  NEURO: Normal strength and tone, mentation intact and speech normal  BACK: no CVA tenderness, no paralumbar tenderness  LYMPH: no cervical, supraclavicular, axillary, or inguinal adenopathy    Diagnostic Test Results:  Labs reviewed in Epic    ASSESSMENT/PLAN:   (Z00.00) Routine general medical examination at a health care facility  (primary encounter diagnosis)  Comment: overall ok, h=needs to work on diet. exercse  Plan: TSH with free T4 reflex, Comprehensive         metabolic panel (BMP + Alb, Alk Phos, ALT, AST,        Total. Bili, TP)            (Z13.6) CARDIOVASCULAR SCREENING; LDL GOAL LESS THAN 130  Comment: rescheck  Plan: Lipid panel reflex to direct LDL Fasting        Father is on statin    (F90.2) Attention deficit hyperactivity disorder (ADHD), combined type  Comment: seeing psych  Plan: Follow up with consultant as planned.       COUNSELING:   Reviewed preventive health counseling, as reflected in patient instructions       Regular exercise       Healthy diet/nutrition       Vision screening       Family planning       Safe sex practices/STD prevention    Estimated body mass index is 22 kg/m  as calculated from the following:    Height as of this encounter: 1.793 m (5' 10.6\").    Weight as of this encounter: 70.8 kg (156 lb).         He reports that he has never smoked. His smokeless tobacco use includes chew.  Tobacco Cessation Action Plan:   Information offered: Patient not interested at this time      Counseling Resources:  ATP IV Guidelines  Pooled Cohorts Equation Calculator  FRAX Risk Assessment  ICSI Preventive Guidelines  Dietary Guidelines for Americans, 2010  USDA's MyPlate  ASA Prophylaxis  Lung CA Screening    Anurag Valle MD  [facility]  "

## 2021-09-08 LAB
ALBUMIN SERPL-MCNC: 4.3 G/DL (ref 3.4–5)
ALP SERPL-CCNC: 78 U/L (ref 40–150)
ALT SERPL W P-5'-P-CCNC: 22 U/L (ref 0–70)
ANION GAP SERPL CALCULATED.3IONS-SCNC: 2 MMOL/L (ref 3–14)
AST SERPL W P-5'-P-CCNC: 14 U/L (ref 0–45)
BILIRUB SERPL-MCNC: 0.4 MG/DL (ref 0.2–1.3)
BUN SERPL-MCNC: 15 MG/DL (ref 7–30)
CALCIUM SERPL-MCNC: 8.6 MG/DL (ref 8.5–10.1)
CHLORIDE BLD-SCNC: 106 MMOL/L (ref 94–109)
CHOLEST SERPL-MCNC: 129 MG/DL
CO2 SERPL-SCNC: 29 MMOL/L (ref 20–32)
CREAT SERPL-MCNC: 1.03 MG/DL (ref 0.66–1.25)
GFR SERPL CREATININE-BSD FRML MDRD: >90 ML/MIN/1.73M2
GLUCOSE BLD-MCNC: 95 MG/DL (ref 70–99)
HDLC SERPL-MCNC: 36 MG/DL
LDLC SERPL CALC-MCNC: 61 MG/DL
NONHDLC SERPL-MCNC: 93 MG/DL
POTASSIUM BLD-SCNC: 4.3 MMOL/L (ref 3.4–5.3)
PROT SERPL-MCNC: 7.5 G/DL (ref 6.8–8.8)
SODIUM SERPL-SCNC: 137 MMOL/L (ref 133–144)
TRIGL SERPL-MCNC: 161 MG/DL
TSH SERPL DL<=0.005 MIU/L-ACNC: 3.78 MU/L (ref 0.4–4)

## 2021-10-10 ENCOUNTER — HEALTH MAINTENANCE LETTER (OUTPATIENT)
Age: 31
End: 2021-10-10

## 2022-09-12 ASSESSMENT — ENCOUNTER SYMPTOMS
ARTHRALGIAS: 0
NAUSEA: 0
WEAKNESS: 0
HEMATURIA: 0
SHORTNESS OF BREATH: 0
HEARTBURN: 0
CHILLS: 0
FEVER: 0
NERVOUS/ANXIOUS: 0
MYALGIAS: 0
DIARRHEA: 0
ABDOMINAL PAIN: 0
DIZZINESS: 0
SORE THROAT: 0
HEMATOCHEZIA: 0
HEADACHES: 0
JOINT SWELLING: 0
EYE PAIN: 0
CONSTIPATION: 0
COUGH: 0
DYSURIA: 0
PARESTHESIAS: 0
FREQUENCY: 0
PALPITATIONS: 0

## 2022-09-13 ENCOUNTER — OFFICE VISIT (OUTPATIENT)
Dept: FAMILY MEDICINE | Facility: CLINIC | Age: 32
End: 2022-09-13
Payer: COMMERCIAL

## 2022-09-13 VITALS
BODY MASS INDEX: 22.98 KG/M2 | HEIGHT: 70 IN | DIASTOLIC BLOOD PRESSURE: 72 MMHG | OXYGEN SATURATION: 100 % | HEART RATE: 56 BPM | SYSTOLIC BLOOD PRESSURE: 120 MMHG | TEMPERATURE: 98.4 F | WEIGHT: 160.5 LBS

## 2022-09-13 DIAGNOSIS — J30.2 SEASONAL ALLERGIC RHINITIS, UNSPECIFIED TRIGGER: ICD-10-CM

## 2022-09-13 DIAGNOSIS — Z00.00 ROUTINE HISTORY AND PHYSICAL EXAMINATION OF ADULT: Primary | ICD-10-CM

## 2022-09-13 DIAGNOSIS — H91.90 DECREASED HEARING, UNSPECIFIED LATERALITY: ICD-10-CM

## 2022-09-13 DIAGNOSIS — F51.04 PSYCHOPHYSIOLOGICAL INSOMNIA: ICD-10-CM

## 2022-09-13 PROCEDURE — 99395 PREV VISIT EST AGE 18-39: CPT | Performed by: FAMILY MEDICINE

## 2022-09-13 PROCEDURE — 36415 COLL VENOUS BLD VENIPUNCTURE: CPT | Performed by: FAMILY MEDICINE

## 2022-09-13 PROCEDURE — 84443 ASSAY THYROID STIM HORMONE: CPT | Performed by: FAMILY MEDICINE

## 2022-09-13 PROCEDURE — 80053 COMPREHEN METABOLIC PANEL: CPT | Performed by: FAMILY MEDICINE

## 2022-09-13 ASSESSMENT — ENCOUNTER SYMPTOMS
HEMATOCHEZIA: 0
PARESTHESIAS: 0
WEAKNESS: 0
NERVOUS/ANXIOUS: 0
HEADACHES: 0
PALPITATIONS: 0
SHORTNESS OF BREATH: 0
NAUSEA: 0
ARTHRALGIAS: 0
HEMATURIA: 0
CHILLS: 0
HEARTBURN: 0
DIZZINESS: 0
ABDOMINAL PAIN: 0
COUGH: 0
MYALGIAS: 0
SORE THROAT: 0
EYE PAIN: 0
FREQUENCY: 0
DYSURIA: 0
CONSTIPATION: 0
JOINT SWELLING: 0
FEVER: 0
DIARRHEA: 0

## 2022-09-13 ASSESSMENT — PATIENT HEALTH QUESTIONNAIRE - PHQ9
SUM OF ALL RESPONSES TO PHQ QUESTIONS 1-9: 9
10. IF YOU CHECKED OFF ANY PROBLEMS, HOW DIFFICULT HAVE THESE PROBLEMS MADE IT FOR YOU TO DO YOUR WORK, TAKE CARE OF THINGS AT HOME, OR GET ALONG WITH OTHER PEOPLE: SOMEWHAT DIFFICULT
SUM OF ALL RESPONSES TO PHQ QUESTIONS 1-9: 9

## 2022-09-13 ASSESSMENT — PAIN SCALES - GENERAL: PAINLEVEL: NO PAIN (0)

## 2022-09-13 NOTE — PROGRESS NOTES
SUBJECTIVE:   CC: Balbir is an 32 year old who presents for preventative health visit.     Patient has been advised of split billing requirements and indicates understanding: Yes  Healthy Habits:     Getting at least 3 servings of Calcium per day:  NO    Bi-annual eye exam:  NO    Dental care twice a year:  Yes    Sleep apnea or symptoms of sleep apnea:  Daytime drowsiness    Diet:  Regular (no restrictions)    Frequency of exercise:  1 day/week    Duration of exercise:  15-30 minutes    Taking medications regularly:  Yes    Medication side effects:  None    PHQ-2 Total Score: 2    Additional concerns today:  No    Answers for HPI/ROS submitted by the patient on 9/13/2022  If you checked off any problems, how difficult have these problems made it for you to do your work, take care of things at home, or get along with other people?: Somewhat difficult  PHQ9 TOTAL SCORE: 9        Encounter Diagnoses   Name Primary?     Routine history and physical examination of adult Yes     Seasonal allergic rhinitis, unspecified trigger, worse,  Would like to see allergist      Psychophysiological insomnia, need had work up      Decreased hearing, unspecified laterality          Today's PHQ-2 Score:   PHQ-2 ( 1999 Pfizer) 9/12/2022   Q1: Little interest or pleasure in doing things 1   Q2: Feeling down, depressed or hopeless 1   PHQ-2 Score 2   PHQ-2 Total Score (12-17 Years)- Positive if 3 or more points; Administer PHQ-A if positive -   Q1: Little interest or pleasure in doing things Several days   Q2: Feeling down, depressed or hopeless Several days   PHQ-2 Score 2       Would like a referral for allergies and a referral to an audiologist       Abuse: Current or Past(Physical, Sexual or Emotional)- No  Do you feel safe in your environment? Yes        Social History     Tobacco Use     Smoking status: Never Smoker     Smokeless tobacco: Current User     Types: Chew   Substance Use Topics     Alcohol use: Yes     Comment: couple beers  per week     If you drink alcohol do you typically have >3 drinks per day or >7 drinks per week? No    Alcohol Use 9/12/2022   Prescreen: >3 drinks/day or >7 drinks/week? No   Prescreen: >3 drinks/day or >7 drinks/week? -   No flowsheet data found.    Last PSA: No results found for: PSA    Reviewed orders with patient. Reviewed health maintenance and updated orders accordingly - Yes  Lab work is in process    Reviewed and updated as needed this visit by clinical staff                    Reviewed and updated as needed this visit by Provider                   History reviewed. No pertinent past medical history.     Review of Systems   Constitutional: Negative for chills and fever.   HENT: Negative for congestion, ear pain, hearing loss and sore throat.    Eyes: Negative for pain and visual disturbance.   Respiratory: Negative for cough and shortness of breath.    Cardiovascular: Negative for chest pain, palpitations and peripheral edema.   Gastrointestinal: Negative for abdominal pain, constipation, diarrhea, heartburn, hematochezia and nausea.   Genitourinary: Negative for dysuria, frequency, genital sores, hematuria, impotence, penile discharge and urgency.   Musculoskeletal: Negative for arthralgias, joint swelling and myalgias.   Skin: Negative for rash.   Neurological: Negative for dizziness, weakness, headaches and paresthesias.   Psychiatric/Behavioral: Negative for mood changes. The patient is not nervous/anxious.          OBJECTIVE:   There were no vitals taken for this visit.    Physical Exam  GENERAL: healthy, alert and no distress  EYES: Eyes grossly normal to inspection, PERRL and conjunctivae and sclerae normal  HENT: ear canals and TM's normal, nose and mouth without ulcers or lesions  NECK: no adenopathy, no asymmetry, masses, or scars and thyroid normal to palpation  RESP: lungs clear to auscultation - no rales, rhonchi or wheezes  CV: regular rate and rhythm, normal S1 S2, no S3 or S4, no murmur,  "click or rub, no peripheral edema and peripheral pulses strong  ABDOMEN: soft, nontender, no hepatosplenomegaly, no masses and bowel sounds normal   (male): normal male genitalia without lesions or urethral discharge, no hernia  MS: no gross musculoskeletal defects noted, no edema  SKIN: no suspicious lesions or rashes  NEURO: Normal strength and tone, mentation intact and speech normal  PSYCH: mentation appears normal, affect normal/bright  LYMPH: no cervical, supraclavicular, axillary, or inguinal adenopathy        ASSESSMENT/PLAN:   (Z00.00) Routine history and physical examination of adult  (primary encounter diagnosis)  Comment: overall in good health  Plan: Comprehensive metabolic panel (BMP + Alb, Alk         Phos, ALT, AST, Total. Bili, TP), TSH with free        T4 reflex            (J30.2) Seasonal allergic rhinitis, unspecified trigger  Comment: worse , so see  Plan: Adult Allergy/Asthma Referral, Adult ENT          Referral            (F51.04) Psychophysiological insomnia  Comment: try assessment  Plan: Adult Sleep Eval & Management          Referral            (H91.90) Decreased hearing, unspecified laterality  Comment: see audiology  Plan: Adult ENT  Referral                  COUNSELING:   Reviewed preventive health counseling, as reflected in patient instructions       Regular exercise       Healthy diet/nutrition       Vision screening       Hearing screening       Safe sex practices/STD prevention    Estimated body mass index is 22 kg/m  as calculated from the following:    Height as of 9/7/21: 1.793 m (5' 10.6\").    Weight as of 9/7/21: 70.8 kg (156 lb).         He reports that he has never smoked. His smokeless tobacco use includes chew.  Nicotine/Tobacco Cessation Plan:   Information offered: Patient not interested at this time      Counseling Resources:  ATP IV Guidelines  Pooled Cohorts Equation Calculator  FRAX Risk Assessment  ICSI Preventive Guidelines  Dietary " Guidelines for Americans, 2010  USDA's MyPlate  ASA Prophylaxis  Lung CA Screening    Anurag Valle MD  [facility]

## 2022-09-14 LAB
ALBUMIN SERPL-MCNC: 4.4 G/DL (ref 3.4–5)
ALP SERPL-CCNC: 80 U/L (ref 40–150)
ALT SERPL W P-5'-P-CCNC: 27 U/L (ref 0–70)
ANION GAP SERPL CALCULATED.3IONS-SCNC: 3 MMOL/L (ref 3–14)
AST SERPL W P-5'-P-CCNC: 15 U/L (ref 0–45)
BILIRUB SERPL-MCNC: 0.4 MG/DL (ref 0.2–1.3)
BUN SERPL-MCNC: 12 MG/DL (ref 7–30)
CALCIUM SERPL-MCNC: 9.4 MG/DL (ref 8.5–10.1)
CHLORIDE BLD-SCNC: 106 MMOL/L (ref 94–109)
CO2 SERPL-SCNC: 28 MMOL/L (ref 20–32)
CREAT SERPL-MCNC: 1.1 MG/DL (ref 0.66–1.25)
GFR SERPL CREATININE-BSD FRML MDRD: >90 ML/MIN/1.73M2
GLUCOSE BLD-MCNC: 87 MG/DL (ref 70–99)
POTASSIUM BLD-SCNC: 4.2 MMOL/L (ref 3.4–5.3)
PROT SERPL-MCNC: 7.3 G/DL (ref 6.8–8.8)
SODIUM SERPL-SCNC: 137 MMOL/L (ref 133–144)
TSH SERPL DL<=0.005 MIU/L-ACNC: 1.78 MU/L (ref 0.4–4)

## 2022-09-17 ENCOUNTER — HEALTH MAINTENANCE LETTER (OUTPATIENT)
Age: 32
End: 2022-09-17

## 2023-12-13 ENCOUNTER — OFFICE VISIT (OUTPATIENT)
Dept: FAMILY MEDICINE | Facility: CLINIC | Age: 33
End: 2023-12-13
Payer: COMMERCIAL

## 2023-12-13 VITALS
TEMPERATURE: 98.3 F | OXYGEN SATURATION: 100 % | DIASTOLIC BLOOD PRESSURE: 76 MMHG | HEIGHT: 70 IN | SYSTOLIC BLOOD PRESSURE: 125 MMHG | BODY MASS INDEX: 23.26 KG/M2 | RESPIRATION RATE: 14 BRPM | WEIGHT: 162.5 LBS | HEART RATE: 67 BPM

## 2023-12-13 DIAGNOSIS — Z00.00 ROUTINE GENERAL MEDICAL EXAMINATION AT A HEALTH CARE FACILITY: Primary | ICD-10-CM

## 2023-12-13 DIAGNOSIS — J30.2 SEASONAL ALLERGIC RHINITIS, UNSPECIFIED TRIGGER: ICD-10-CM

## 2023-12-13 DIAGNOSIS — H91.90 HEARING DISORDER, UNSPECIFIED LATERALITY: ICD-10-CM

## 2023-12-13 DIAGNOSIS — D22.9 MULTIPLE NEVI: ICD-10-CM

## 2023-12-13 DIAGNOSIS — N39.3 STRESS INCONTINENCE: ICD-10-CM

## 2023-12-13 DIAGNOSIS — Z11.59 NEED FOR HEPATITIS C SCREENING TEST: ICD-10-CM

## 2023-12-13 DIAGNOSIS — F33.41 RECURRENT MAJOR DEPRESSIVE DISORDER, IN PARTIAL REMISSION (H): ICD-10-CM

## 2023-12-13 PROCEDURE — 99395 PREV VISIT EST AGE 18-39: CPT | Performed by: FAMILY MEDICINE

## 2023-12-13 RX ORDER — BUPROPION HYDROCHLORIDE 150 MG/1
TABLET ORAL
COMMUNITY
Start: 2021-08-01

## 2023-12-13 ASSESSMENT — ENCOUNTER SYMPTOMS
PARESTHESIAS: 0
HEMATURIA: 0
DIZZINESS: 0
FREQUENCY: 1
ABDOMINAL PAIN: 0
CONSTIPATION: 0
HEARTBURN: 0
SORE THROAT: 0
NERVOUS/ANXIOUS: 0
DYSURIA: 0
FEVER: 0
SHORTNESS OF BREATH: 0
WEAKNESS: 0
DIARRHEA: 0
EYE PAIN: 0
JOINT SWELLING: 0
HEADACHES: 0
COUGH: 1
MYALGIAS: 0
PALPITATIONS: 0
CHILLS: 0
HEMATOCHEZIA: 0
ARTHRALGIAS: 0
NAUSEA: 0

## 2023-12-13 ASSESSMENT — PAIN SCALES - GENERAL: PAINLEVEL: MILD PAIN (3)

## 2023-12-13 ASSESSMENT — PATIENT HEALTH QUESTIONNAIRE - PHQ9
SUM OF ALL RESPONSES TO PHQ QUESTIONS 1-9: 13
SUM OF ALL RESPONSES TO PHQ QUESTIONS 1-9: 13
10. IF YOU CHECKED OFF ANY PROBLEMS, HOW DIFFICULT HAVE THESE PROBLEMS MADE IT FOR YOU TO DO YOUR WORK, TAKE CARE OF THINGS AT HOME, OR GET ALONG WITH OTHER PEOPLE: VERY DIFFICULT

## 2023-12-13 NOTE — PROGRESS NOTES
"SUBJECTIVE:   Balbir is a 33 year old, presenting for the following:  Physical        12/13/2023    12:42 PM   Additional Questions   Roomed by Cisco Hanson       Healthy Habits:     Getting at least 3 servings of Calcium per day:  Yes    Bi-annual eye exam:  NO    Dental care twice a year:  Yes    Sleep apnea or symptoms of sleep apnea:  Daytime drowsiness    Diet:  Regular (no restrictions)    Frequency of exercise:  2-3 days/week    Duration of exercise:  15-30 minutes    Taking medications regularly:  No    Barriers to taking medications:  Other    Medication side effects:  Other    Additional concerns today:  Yes    Depression is going well       Today's PHQ-9 Score:       12/13/2023    12:29 PM   PHQ-9 SCORE   PHQ-9 Total Score MyChart 13 (Moderate depression)   PHQ-9 Total Score 13     Social History     Tobacco Use    Smoking status: Never    Smokeless tobacco: Former     Types: Chew    Tobacco comments:     Occasionally   Substance Use Topics    Alcohol use: Not Currently             12/13/2023    12:28 PM   Alcohol Use   Prescreen: >3 drinks/day or >7 drinks/week? No          No data to display                Last PSA: No results found for: \"PSA\"    Reviewed orders with patient. Reviewed health maintenance and updated orders accordingly - Yes    Reviewed and updated as needed this visit by clinical staff   Tobacco  Allergies  Meds   Med Hx  Surg Hx  Fam Hx  Soc Hx        Reviewed and updated as needed this visit by Provider   Tobacco     Med Hx  Surg Hx  Fam Hx  Soc Hx         Review of Systems   Constitutional:  Negative for chills and fever.   HENT:  Negative for congestion, ear pain, hearing loss and sore throat.    Eyes:  Negative for pain and visual disturbance.   Respiratory:  Positive for cough. Negative for shortness of breath.    Cardiovascular:  Negative for chest pain, palpitations and peripheral edema.   Gastrointestinal:  Negative for abdominal pain, constipation, diarrhea, heartburn, " "hematochezia and nausea.   Genitourinary:  Positive for frequency. Negative for dysuria, genital sores, hematuria, impotence, penile discharge and urgency.   Musculoskeletal:  Negative for arthralgias, joint swelling and myalgias.   Skin:  Negative for rash.   Neurological:  Negative for dizziness, weakness, headaches and paresthesias.   Psychiatric/Behavioral:  Negative for mood changes. The patient is not nervous/anxious.      Fairly frequent urination     Stress incontinence with walking       OBJECTIVE:   /76 (BP Location: Left arm, Patient Position: Sitting, Cuff Size: Adult Regular)   Pulse 67   Temp 98.3  F (36.8  C) (Temporal)   Resp 14   Ht 1.776 m (5' 9.92\")   Wt 73.7 kg (162 lb 8 oz)   SpO2 100%   BMI 23.37 kg/m      Physical Exam  Constitutional:       General: He is not in acute distress.     Appearance: Normal appearance.   HENT:      Head: Normocephalic.      Right Ear: Tympanic membrane, ear canal and external ear normal.      Left Ear: Tympanic membrane, ear canal and external ear normal.      Mouth/Throat:      Mouth: Mucous membranes are moist.      Pharynx: No oropharyngeal exudate or posterior oropharyngeal erythema.   Eyes:      General: No scleral icterus.  Cardiovascular:      Rate and Rhythm: Normal rate and regular rhythm.      Heart sounds: No murmur heard.  Pulmonary:      Effort: Pulmonary effort is normal. No respiratory distress.      Breath sounds: Normal breath sounds.   Abdominal:      General: Abdomen is flat. Bowel sounds are normal. There is no distension.      Palpations: Abdomen is soft. There is no mass.      Hernia: No hernia is present.   Lymphadenopathy:      Cervical: No cervical adenopathy.   Neurological:      General: No focal deficit present.      Mental Status: He is alert.   Psychiatric:         Mood and Affect: Mood normal.         Behavior: Behavior normal.           ASSESSMENT/PLAN:       ICD-10-CM    1. Routine general medical examination at a [facility]" care facility  Z00.00 REVIEW OF HEALTH MAINTENANCE PROTOCOL ORDERS      2. Need for hepatitis C screening test  Z11.59 Hepatitis C Screen Reflex to HCV RNA Quant and Genotype      3. Stress incontinence  N39.3 Adult Urology  Referral      4. Seasonal allergic rhinitis, unspecified trigger  J30.2 Adult Allergy/Asthma  Referral      5. Hearing disorder, unspecified laterality  H91.90 Adult Audiology  Referral      6. Multiple nevi  D22.9 Adult Dermatology  Referral            Depression Screening Follow Up        12/13/2023    12:29 PM   PHQ   PHQ-9 Total Score 13   Q9: Thoughts of better off dead/self-harm past 2 weeks Not at all       He reports that he has never smoked. He has quit using smokeless tobacco.  His smokeless tobacco use included chew.            Darwin Park DO  [facility]  Answers submitted by the patient for this visit:  Patient Health Questionnaire (Submitted on 12/13/2023)  If you checked off any problems, how difficult have these problems made it for you to do your work, take care of things at home, or get along with other people?: Very difficult  PHQ9 TOTAL SCORE: 13

## 2024-01-03 NOTE — TELEPHONE ENCOUNTER
MEDICAL RECORDS REQUEST   [facility]  Urology Clinic  [address] [place] [address]  PHONE: [phone]  Fax: [phone]        FUTURE VISIT INFORMATION                                                   Craig Gusman YOB: 1990 scheduled for future visit at [facility]    APPOINTMENT INFORMATION:  Date: 2024  Provider:  Lang Valerio MD  Reason for Visit/Diagnosis: stress urinary incontence, frequently leaks    REFERRAL INFORMATION:  Referring provider:  Darwin Park DO in  PRIMARY CARE      RECORDS REQUESTED FOR VISIT                                                     NOTES  STATUS/DETAILS   OFFICE NOTE from referring provider  yes, 2023 -- Darwin Park DO in  PRIMARY CARE   MEDICATION LIST  yes     PRE-VISIT CHECKLIST      Joint diagnostic appointment coordinated correctly          (ensure right order & amount of time) Yes   RECORD COLLECTION COMPLETE Yes

## 2024-01-23 ENCOUNTER — TELEPHONE (OUTPATIENT)
Dept: UROLOGY | Facility: CLINIC | Age: 34
End: 2024-01-23
Payer: COMMERCIAL

## 2024-01-23 NOTE — TELEPHONE ENCOUNTER
Left Voicemail (1st Attempt) and Sent Mychart (1st Attempt) for the patient to call back and schedule the following:    Appointment type: New Urology  Provider: Dr. Valerio  Return date: Feb 9th or 23rd  Specialty phone number: [phone]  Additional appointment(s) needed: N/A  Additonal Notes: Provider not in clinic on Feb 12th, need to reschedule

## 2024-02-07 ENCOUNTER — PRE VISIT (OUTPATIENT)
Dept: UROLOGY | Facility: CLINIC | Age: 34
End: 2024-02-07
Payer: COMMERCIAL

## 2024-02-07 NOTE — TELEPHONE ENCOUNTER
Reason for visit: consult     Relevant information: stress incontinence    Records/imaging/labs/orders:     Pt called: No need for a call    At Rooming: leave bladder full    Willie Bonilla  2/7/2024  1:06 PM

## 2024-02-12 ENCOUNTER — TELEPHONE (OUTPATIENT)
Dept: UROLOGY | Facility: CLINIC | Age: 34
End: 2024-02-12

## 2024-02-12 ENCOUNTER — PRE VISIT (OUTPATIENT)
Dept: UROLOGY | Facility: CLINIC | Age: 34
End: 2024-02-12

## 2024-02-12 NOTE — TELEPHONE ENCOUNTER
Pt called to confirm appt has been cancelled for 2/12, and confirmed new urolgy appt for 2/26 with Dr. Valerio

## 2024-02-26 ENCOUNTER — OFFICE VISIT (OUTPATIENT)
Dept: UROLOGY | Facility: CLINIC | Age: 34
End: 2024-02-26
Attending: FAMILY MEDICINE
Payer: COMMERCIAL

## 2024-02-26 VITALS
DIASTOLIC BLOOD PRESSURE: 82 MMHG | SYSTOLIC BLOOD PRESSURE: 119 MMHG | HEART RATE: 87 BPM | BODY MASS INDEX: 22.4 KG/M2 | OXYGEN SATURATION: 98 % | WEIGHT: 160 LBS | HEIGHT: 71 IN

## 2024-02-26 DIAGNOSIS — N39.3 STRESS INCONTINENCE: ICD-10-CM

## 2024-02-26 PROCEDURE — 51798 US URINE CAPACITY MEASURE: CPT | Performed by: STUDENT IN AN ORGANIZED HEALTH CARE EDUCATION/TRAINING PROGRAM

## 2024-02-26 PROCEDURE — 99204 OFFICE O/P NEW MOD 45 MIN: CPT | Mod: 25 | Performed by: STUDENT IN AN ORGANIZED HEALTH CARE EDUCATION/TRAINING PROGRAM

## 2024-02-26 ASSESSMENT — PAIN SCALES - GENERAL: PAINLEVEL: NO PAIN (0)

## 2024-02-26 NOTE — PATIENT INSTRUCTIONS
Schedule for next available cystoscopy with Dr. Valerio.    It was a pleasure meeting with you today.  Thank you for allowing me and my team the privilege of caring for you today.  YOU are the reason we are here, and I truly hope we provided you with the excellent service you deserve.  Please let us know if there is anything else we can do for you so that we can be sure you are leaving completely satisfied with your care experience.

## 2024-02-26 NOTE — NURSING NOTE
"Craig Gusman is a 33 year old male patient that presents today in clinic for the following:    Chief Complaint   Patient presents with    Consult     Feeling of incomplete bladder emptying  Urine dribbling after using restroom when standing and walking       The patient's allergies and medications were reviewed as noted. A set of vitals were recorded as noted without incident. The patient does not have any other questions for the provider.    Blood pressure 119/82, pulse 87, height 1.803 m (5' 11\"), weight 72.6 kg (160 lb), SpO2 98%. Body mass index is 22.32 kg/m .    Patient Active Problem List   Diagnosis    Chews tobacco       No Known Allergies    Current Outpatient Medications   Medication Sig Dispense Refill    amphetamine-dextroamphetamine (ADDERALL XR) 15 MG 24 hr capsule       buPROPion (WELLBUTRIN XL) 150 MG 24 hr tablet       loratadine (CLARITIN) 10 MG tablet Take 10 mg by mouth daily as needed for allergies         Social History     Tobacco Use    Smoking status: Every Day     Types: Vaping Device    Smokeless tobacco: Former     Types: Chew    Tobacco comments:     Occasionally   Vaping Use    Vaping Use: Every day    Substances: Nicotine, Flavoring    Devices: Disposable   Substance Use Topics    Alcohol use: Not Currently    Drug use: Yes     Types: Marijuana     Comment: Smoking       Jeaninecarson Treadwell  2/26/2024  3:00 PM  "

## 2024-02-26 NOTE — PROGRESS NOTES
Name: Craig Gusman    MRN: [id number]   YOB: 1990                 Chief Complaint:   Urinary incontinence          History of Present Illness:   Mr. Craig Gusman is a 33 year old male seen in consultation from Dr. Darwin Park for bothersome post void incontinence. He reports that his has been going on for some time, but reports relative increase in frequency of urination since starting a few new medications which has exacerbated his symptoms..  Occurs once he has started walking around after voids, more than just a few drops -- reports about 2oz of urine. He has tried double voiding and often feels he has to milk his urethra following voids. Does not use pads.   Denies stress incontinence and urge incontinence  Feels like he does not completely empty.   He has no history of urinary issues, urologic surgery or UTIs.   He denies hematuria, dysuria, hesitancy, straining to urinate, weak stream or split stream.           Past Medical History:     Past Medical History:   Diagnosis Date    Major depressive disorder, recurrent episode, in partial remission (H24)             Past Surgical History:   No past surgical history on file.         Social History:     Social History     Tobacco Use    Smoking status: Every Day     Types: Vaping Device    Smokeless tobacco: Former     Types: Chew    Tobacco comments:     Occasionally   Substance Use Topics    Alcohol use: Not Currently            Family History:     Family History   Problem Relation Age of Onset    Hyperlipidemia Father     Crohn's Disease Sister     Coronary Artery Disease Early Onset Maternal Grandfather     No Known Problems Paternal Grandmother     Lung Cancer Paternal Grandfather         non-smoking              Allergies:   No Known Allergies         Medications:     Current Outpatient Medications   Medication Sig    amphetamine-dextroamphetamine (ADDERALL XR) 15 MG 24 hr capsule     buPROPion (WELLBUTRIN XL) 150 MG 24 hr tablet      "loratadine (CLARITIN) 10 MG tablet Take 10 mg by mouth daily as needed for allergies     No current facility-administered medications for this visit.             Review of Systems:    ROS: 14 point ROS neg other than the symptoms noted above in the HPI.          Physical Exam:   B/P: 119/82, T: Data Unavailable, P: 87, R: Data Unavailable  Estimated body mass index is 22.32 kg/m  as calculated from the following:    Height as of this encounter: 1.803 m (5' 11\").    Weight as of this encounter: 72.6 kg (160 lb).  General: age-appropriate appearing male in NAD. .  HEENT: Head AT/NC, EOMI, CN Grossly intact  Resp: no respiratory distress,  Abdomen: soft, non-distended, non-tender. No organomegaly  : deferred  LE: no edema.   Neuro: grossly intact  Motor: excellent strength throughout  Skin: clear of rashes or ecchymoses.        Labs:    All laboratory data reviewed with patient  None      Imaging:    All imaging reviewed with patient.  None      Outside records:    I spent 10 minutes reviewing outside records.         Assessment and Plan:   33 year old male with bothersome urinary leakage following voids that are described as larger volumes than just post void dribbling.  PVR 0 today  The cause of his incontinence is unclear. We discussed the various reasons why he may be experiencing urinary incontinence given it is relatively uncommon in otherwise healthy young men. Possible explanations could be pelvic floor dysfunction, urethral diverticulum or stricture.    Reviewed behavioral modifications to reduce urinary frequency that is exacerbating symptoms  He would like an office cystoscopy to rule out anatomic abnormality  Following work up will consider referral for pelvic floor physical therapy      Lang Valerio MD  February 26, 2024      "

## 2024-02-26 NOTE — LETTER
2/26/2024       RE: Craig Gusman  [address] [place] [address]     Dear Colleague,    Thank you for referring your patient, Craig Gusman, to the [facility]. Please see a copy of my visit note below.      Name: Craig Gusman    MRN: [id number]   YOB: 1990                 Chief Complaint:   Urinary incontinence          History of Present Illness:   Mr. Craig Gusman is a 33 year old male seen in consultation from Dr. Darwin Park for bothersome post void incontinence. He reports that his has been going on for some time, but reports relative increase in frequency of urination since starting a few new medications which has exacerbated his symptoms..  Occurs once he has started walking around after voids, more than just a few drops -- reports about 2oz of urine. He has tried double voiding and often feels he has to milk his urethra following voids. Does not use pads.   Denies stress incontinence and urge incontinence  Feels like he does not completely empty.   He has no history of urinary issues, urologic surgery or UTIs.   He denies hematuria, dysuria, hesitancy, straining to urinate, weak stream or split stream.           Past Medical History:     Past Medical History:   Diagnosis Date    Major depressive disorder, recurrent episode, in partial remission (H24)             Past Surgical History:   No past surgical history on file.         Social History:     Social History     Tobacco Use    Smoking status: Every Day     Types: Vaping Device    Smokeless tobacco: Former     Types: Chew    Tobacco comments:     Occasionally   Substance Use Topics    Alcohol use: Not Currently            Family History:     Family History   Problem Relation Age of Onset    Hyperlipidemia Father     Crohn's Disease Sister     Coronary Artery Disease Early Onset Maternal Grandfather     No Known Problems  "Paternal Grandmother     Lung Cancer Paternal Grandfather         non-smoking              Allergies:   No Known Allergies         Medications:     Current Outpatient Medications   Medication Sig    amphetamine-dextroamphetamine (ADDERALL XR) 15 MG 24 hr capsule     buPROPion (WELLBUTRIN XL) 150 MG 24 hr tablet     loratadine (CLARITIN) 10 MG tablet Take 10 mg by mouth daily as needed for allergies     No current facility-administered medications for this visit.             Review of Systems:    ROS: 14 point ROS neg other than the symptoms noted above in the HPI.          Physical Exam:   B/P: 119/82, T: Data Unavailable, P: 87, R: Data Unavailable  Estimated body mass index is 22.32 kg/m  as calculated from the following:    Height as of this encounter: 1.803 m (5' 11\").    Weight as of this encounter: 72.6 kg (160 lb).  General: age-appropriate appearing male in NAD. .  HEENT: Head AT/NC, EOMI, CN Grossly intact  Resp: no respiratory distress,  Abdomen: soft, non-distended, non-tender. No organomegaly  : deferred  LE: no edema.   Neuro: grossly intact  Motor: excellent strength throughout  Skin: clear of rashes or ecchymoses.        Labs:    All laboratory data reviewed with patient  None      Imaging:    All imaging reviewed with patient.  None      Outside records:    I spent 10 minutes reviewing outside records.         Assessment and Plan:   33 year old male with bothersome urinary leakage following voids that are described as larger volumes than just post void dribbling.  PVR 0 today  The cause of his incontinence is unclear. We discussed the various reasons why he may be experiencing urinary incontinence given it is relatively uncommon in otherwise healthy young men. Possible explanations could be pelvic floor dysfunction, urethral diverticulum or stricture.    Reviewed behavioral modifications to reduce urinary frequency that is exacerbating symptoms  He would like an office cystoscopy to rule out " anatomic abnormality  Following work up will consider referral for pelvic floor physical therapy      Lang Valerio MD  February 26, 2024

## 2024-02-27 ENCOUNTER — OFFICE VISIT (OUTPATIENT)
Dept: DERMATOLOGY | Facility: CLINIC | Age: 34
End: 2024-02-27
Payer: COMMERCIAL

## 2024-02-27 DIAGNOSIS — D18.01 CHERRY ANGIOMA: ICD-10-CM

## 2024-02-27 DIAGNOSIS — D22.39 FIBROUS PAPULE OF NOSE: ICD-10-CM

## 2024-02-27 DIAGNOSIS — L70.5 EXCORIATED ACNE: Primary | ICD-10-CM

## 2024-02-27 DIAGNOSIS — D22.9 MULTIPLE NEVI: ICD-10-CM

## 2024-02-27 PROCEDURE — 99203 OFFICE O/P NEW LOW 30 MIN: CPT | Performed by: PHYSICIAN ASSISTANT

## 2024-02-27 RX ORDER — CLINDAMYCIN PHOSPHATE 10 UG/ML
LOTION TOPICAL 2 TIMES DAILY
Qty: 60 ML | Refills: 4 | Status: SHIPPED | OUTPATIENT
Start: 2024-02-27

## 2024-02-27 ASSESSMENT — PAIN SCALES - GENERAL: PAINLEVEL: NO PAIN (0)

## 2024-02-27 NOTE — PATIENT INSTRUCTIONS
Patient Education        Proper skin care from [facility]:     -Eliminate harsh soaps as they strip the natural oils from the skin, often resulting in dry itchy skin ( i.e. Dial, Zest, Azeri Spring)  -Use mild soaps such as Cetaphil or Dove Sensitive Skin in the shower. You do not need to use soap on arms, legs, and trunk every time you shower unless visibly soiled.   -Avoid hot or cold showers.  -After showering, lightly dry off and apply moisturizing within 2-3 minutes. This will help trap moisture in the skin.   -Aggressive use of a moisturizer at least 1-2 times a day to the entire body (including -Vanicream, Cetaphil, Aquaphor or Cerave) and moisturize hands after every washing.  -We recommend using moisturizers that come in a tub that needs to be scooped out, not a pump. This has more of an oil base. It will hold moisture in your skin much better than a water base moisturizer. The above recommended are non-pore clogging.        Wear a sunscreen with at least SPF 30 on your face, ears, neck and V of the chest daily. Wear sunscreen on other areas of the body if those areas are exposed to the sun throughout the day. Sunscreens can contain physical and/or chemical blockers. Physical blockers are less likely to clog pores, these include zinc oxide and titanium dioxide. Reapply every two hour and after swimming.      Sunscreen examples: https://www.ewg.org/sunscreen/     UV radiation  UVA radiation remains constant throughout the day and throughout the year. It is a longer wavelength than UVB and therefore penetrates deeper into the skin leading to immediate and delayed tanning, photoaging, and skin cancer. 70-80% of UVA and UVB radiation occurs between the hours of 10am-2pm.  UVB radiation  UVB radiation causes the most harmful effects and is more significant during the summer months. However, snow and ice can reflect UVB radiation leading to skin damage during the winter months as well. UVB radiation is  responsible for tanning, burning, inflammation, delayed erythema (pinkness), pigmentation (brown spots), and skin cancer.      I recommend self monthly full body exams and yearly full body exams with a dermatology provider. If you develop a new or changing lesion please follow up for examination. Most skin cancers are pink and scaly or pink and pearly. However, we do see blue/brown/black skin cancers.  Consider the ABCDEs of melanoma when giving yourself your monthly full body exam ( don't forget the groin, buttocks, feet, toes, etc). A-asymmetry, B-borders, C-color, D-diameter, E-elevation or evolving. If you see any of these changes please follow up in clinic. If you cannot see your back I recommend purchasing a hand held mirror to use with a larger wall mirror.       Checking for Skin Cancer  You can find cancer early by checking your skin each month. There are 3 kinds of skin cancer. They are melanoma, basal cell carcinoma, and squamous cell carcinoma. Doing monthly skin checks is the best way to find new marks or skin changes. Follow the instructions below for checking your skin.   The ABCDEs of checking moles for melanoma   Check your moles or growths for signs of melanoma using ABCDE:   Asymmetry: the sides of the mole or growth don t match  Border: the edges are ragged, notched, or blurred  Color: the color within the mole or growth varies  Diameter: the mole or growth is larger than 6 mm (size of a pencil eraser)  Evolving: the size, shape, or color of the mole or growth is changing (evolving is not shown in the images below)    Checking for other types of skin cancer  Basal cell carcinoma or squamous cell carcinoma have symptoms such as:      A spot or mole that looks different from all other marks on your skin  Changes in how an area feels, such as itching, tenderness, or pain  Changes in the skin's surface, such as oozing, bleeding, or scaliness  A sore that does not heal  New swelling or redness beyond  the border of a mole     Who s at risk?  Anyone can get skin cancer. But you are at greater risk if you have:   Fair skin, light-colored hair, or light-colored eyes  Many moles or abnormal moles on your skin  A history of sunburns from sunlight or tanning beds  A family history of skin cancer  A history of exposure to radiation or chemicals  A weakened immune system  If you have had skin cancer in the past, you are at risk for recurring skin cancer.   How to check your skin  Do your monthly skin checkups in front of a full-length mirror. Check all parts of your body, including your:   Head (ears, face, neck, and scalp)  Torso (front, back, and sides)  Arms (tops, undersides, upper, and lower armpits)  Hands (palms, backs, and fingers, including under the nails)  Buttocks and genitals  Legs (front, back, and sides)  Feet (tops, soles, toes, including under the nails, and between toes)  If you have a lot of moles, take digital photos of them each month. Make sure to take photos both up close and from a distance. These can help you see if any moles change over time.   Most skin changes are not cancer. But if you see any changes in your skin, call your doctor right away. Only he or she can diagnose a problem. If you have skin cancer, seeing your doctor can be the first step toward getting the treatment that could save your life.   [employer] last reviewed this educational content on 4/1/2019 2000-2020 [employer]. [address] [place] [address], [place] [address]. All rights reserved. This information is not intended as a substitute for professional medical care. Always follow your healthcare professional's instructions.        When should I call my doctor?  If you are worsening or not improving, please, contact us or seek urgent care as noted below.      Who should I call with questions (adults)?  [facility] (adult and pediatric): [phone]  [facility]  [place] (adult): [phone]  [facility] ([place], [place], [place] and [place]) [phone]  For urgent needs outside of business hours call the [facility] at [phone] and ask for the dermatology resident on call to be paged  If this is a medical emergency and you are unable to reach an ER, Call 911        If you need a prescription refill, please contact your pharmacy. Refills are approved or denied by our Physicians during normal business hours, Monday through Fridays  Per office policy, refills will not be granted if you have not been seen within the past year (or sooner depending on your child's condition)

## 2024-02-27 NOTE — PROGRESS NOTES
[facility] Dermatology Note  Encounter Date: Feb 27, 2024  Office Visit     Reviewed patients past medical history and pertinent chart review prior to patients visit today.     Dermatology Problem List:  # Excoriated acne  - clindamycin 1% lotion    ____________________________________________    Assessment & Plan:     # Fibrous papule  # Cherry angioma, left upper arm  # Scar, right forearm  - We discussed the benign nature of the skin lesions. No treatment is required. I recommend continued observation with follow up should any concerning changes arise.     # Multiple nevi  - No concerning features on dermoscopy. We discussed the importance of self exams at home.   - ABCDEs: Counseled ABCDEs of melanoma: Asymmetry, Border (irregularity), Color (not uniform, changes in color), Diameter (greater than 6 mm which is about the size of a pencil eraser), and Evolving (any changes in preexisting moles).    # Excoriated acne  - The patient expresses awareness of the picking and an effort to stop.   - Start clindamycin 1% lotion twice daily     All risks, benefits and alternatives were discussed with patient.  Patient is in agreement and understands the assessment and plan.  All questions were answered.  Soledad Rhodes PA-C  [facility]  _______________________________________    CC: Derm Problem (Here today for multiple spots of concern. )    HPI:  Mr. Craig Gusman is a(n) 33 year old male who presents today as a new patient for multiple concerns as below. Patient is otherwise feeling well, without additional skin concerns.    Concern 1:   - Lesion on tip of nose  - present for 6-9 months  - No pain, itching, or bleeding at the site.     Concern 2:  - Lesion left upper arm  - Longstanding  - No pain, itching, or bleeding at the site.     Concern 3:  - Lesion on the right forearm  - 1 year ago a cyst developed that the patient manually drained  - would like the area  evaluated    Concern 4:   - Moles on the back  - No changes observed, patient has difficulty monitoring     Concern 5:  - Acne  - Longstanding  - Patient has difficulty avoiding picking at lesions    Physical Exam:  SKIN: Focused examination of face, left upper arm, right forearm, and back was performed.  - The nasal tip demonstrates a 1 mm homogenous skin colored papule, consistent with a fibrous papule.  - Left upper arm demonstrates a red dome shaped papule, consistent with cherry angioma.  - Small hypopigmented scar involving the right forearm.   - Scattered skin colored to brown macules and papules with reassuring features on dermoscopy.   - Crusted papules scattered on the face, chest, and back, consistent with excoriated acne.        - No other lesions of concern on areas examined.     Medications:  Current Outpatient Medications   Medication    amphetamine-dextroamphetamine (ADDERALL XR) 15 MG 24 hr capsule    buPROPion (WELLBUTRIN XL) 150 MG 24 hr tablet    loratadine (CLARITIN) 10 MG tablet     No current facility-administered medications for this visit.      Past Medical History:   Patient Active Problem List   Diagnosis    Chews tobacco     Past Medical History:   Diagnosis Date    Major depressive disorder, recurrent episode, in partial remission (H24)        CC Darwin Park, DO  [address] [place] [address] on close of this encounter.

## 2024-02-27 NOTE — LETTER
2/27/2024       RE: Craig Gusman  [address] [place] [address]     Dear Colleague,    Thank you for referring your patient, Craig Gusman, to the [facility] [place] at [facility]. Please see a copy of my visit note below.    [facility] Dermatology Note  Encounter Date: Feb 27, 2024  Office Visit     Reviewed patients past medical history and pertinent chart review prior to patients visit today.     Dermatology Problem List:  # Excoriated acne  - clindamycin 1% lotion    ____________________________________________    Assessment & Plan:     # Fibrous papule  # Cherry angioma, left upper arm  # Scar, right forearm  - We discussed the benign nature of the skin lesions. No treatment is required. I recommend continued observation with follow up should any concerning changes arise.     # Multiple nevi  - No concerning features on dermoscopy. We discussed the importance of self exams at home.   - ABCDEs: Counseled ABCDEs of melanoma: Asymmetry, Border (irregularity), Color (not uniform, changes in color), Diameter (greater than 6 mm which is about the size of a pencil eraser), and Evolving (any changes in preexisting moles).    # Excoriated acne  - The patient expresses awareness of the picking and an effort to stop.   - Start clindamycin 1% lotion twice daily     All risks, benefits and alternatives were discussed with patient.  Patient is in agreement and understands the assessment and plan.  All questions were answered.  Soledad Rhodes PA-C  [facility]  _______________________________________    CC: Derm Problem (Here today for multiple spots of concern. )    HPI:  Mr. Craig Gusman is a(n) 33 year old male who presents today as a new patient for multiple concerns as below. Patient is otherwise feeling well, without additional skin concerns.    Concern 1:   - Lesion on tip of nose  -  present for 6-9 months  - No pain, itching, or bleeding at the site.     Concern 2:  - Lesion left upper arm  - Longstanding  - No pain, itching, or bleeding at the site.     Concern 3:  - Lesion on the right forearm  - 1 year ago a cyst developed that the patient manually drained  - would like the area evaluated    Concern 4:   - Moles on the back  - No changes observed, patient has difficulty monitoring     Concern 5:  - Acne  - Longstanding  - Patient has difficulty avoiding picking at lesions    Physical Exam:  SKIN: Focused examination of face, left upper arm, right forearm, and back was performed.  - The nasal tip demonstrates a 1 mm homogenous skin colored papule, consistent with a fibrous papule.  - Left upper arm demonstrates a red dome shaped papule, consistent with cherry angioma.  - Small hypopigmented scar involving the right forearm.   - Scattered skin colored to brown macules and papules with reassuring features on dermoscopy.   - Crusted papules scattered on the face, chest, and back, consistent with excoriated acne.        - No other lesions of concern on areas examined.     Medications:  Current Outpatient Medications   Medication    amphetamine-dextroamphetamine (ADDERALL XR) 15 MG 24 hr capsule    buPROPion (WELLBUTRIN XL) 150 MG 24 hr tablet    loratadine (CLARITIN) 10 MG tablet     No current facility-administered medications for this visit.      Past Medical History:   Patient Active Problem List   Diagnosis    Chews tobacco     Past Medical History:   Diagnosis Date    Major depressive disorder, recurrent episode, in partial remission (H24)        DARLENE Park, DO  [address] [place] [address] on close of this encounter.

## 2024-02-27 NOTE — NURSING NOTE
Dermatology Rooming Note    Craig Gusman's goals for this visit include:   Chief Complaint   Patient presents with    Derm Problem     Here today for multiple spots of concern.      Betsy Cameron RN

## 2024-03-12 ENCOUNTER — PRE VISIT (OUTPATIENT)
Dept: UROLOGY | Facility: CLINIC | Age: 34
End: 2024-03-12
Payer: COMMERCIAL

## 2024-03-12 NOTE — TELEPHONE ENCOUNTER
Reason for visit: Cystoscopy         Relevant information: Seen 2/26 for stress incontinence, increase in frequency of urination, leakage during walking after voiding    Records/imaging/labs/orders: all records available    Pt called: no need for a call    At Rooming: ask symptoms, collect a urine/dip    Abundio Shafer  3/12/2024  9:42 AM

## 2024-03-15 ENCOUNTER — OFFICE VISIT (OUTPATIENT)
Dept: AUDIOLOGY | Facility: CLINIC | Age: 34
End: 2024-03-15
Attending: FAMILY MEDICINE
Payer: COMMERCIAL

## 2024-03-15 DIAGNOSIS — H93.13 TINNITUS OF BOTH EARS: Primary | ICD-10-CM

## 2024-03-15 DIAGNOSIS — Z82.2 FAMILY HISTORY OF HEARING LOSS: ICD-10-CM

## 2024-03-15 DIAGNOSIS — H91.90 HEARING DISORDER, UNSPECIFIED LATERALITY: ICD-10-CM

## 2024-03-15 PROCEDURE — 92557 COMPREHENSIVE HEARING TEST: CPT | Performed by: AUDIOLOGIST

## 2024-03-15 PROCEDURE — 92550 TYMPANOMETRY & REFLEX THRESH: CPT | Performed by: AUDIOLOGIST

## 2024-03-15 NOTE — PROGRESS NOTES
AUDIOLOGY REPORT    SUBJECTIVE:  Craig Gusman is a 33 year old male who was seen in the Audiology Clinic at the [facility] for audiologic evaluation, referred by Darwin Park D.O.  The patient reports longstanding difficulty hearing in background noise. He feels hearing is symmetrical between the ears. He also reports history of swimmers ear primarily on the right (last infection was 15 years ago), and PE tubes as a child. Occasional bilateral tinnitus. Reports family history of hearing loss in his mother- unsure age of onset. 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 He denies aural pain, pressure, drainage, dizziness.     OBJECTIVE:  Abuse Screening:  Do you feel unsafe at home or work/school? No  Do you feel threatened by someone? No  Does anyone try to keep you from having contact with others, or doing things outside of your home? No  Physical signs of abuse present? No     Fall Risk Screen:  1. Have you fallen two or more times in the past year? No  2. Have you fallen and had an injury in the past year? No    Otoscopic exam indicates ears are clear of cerumen bilaterally     Pure Tone Thresholds assessed using conventional audiometry with good  reliability from 250-8000 Hz bilaterally using insert earphones and circumaural headphones     RIGHT:  normal hearing sensitivity    LEFT:    normal hearing sensitivity    Tympanogram:    RIGHT: normal eardrum mobility    LEFT:   normal eardrum mobility    Reflexes (reported by stimulus ear):  RIGHT: Ipsilateral is present at normal levels  RIGHT: Contralateral is present at elevated levels  LEFT:   Ipsilateral is present at normal levels  LEFT:   Contralateral is present at elevated levels      Speech Reception Threshold:    RIGHT: 10 dB HL    LEFT:   5 dB HL  Word Recognition Score:     RIGHT: 100% at 50 dB HL using NU-6 recorded word list.    LEFT:   100% at 50 dB HL using NU-6 recorded word  list.      ASSESSMENT:   Normal hearing bilaterally. Today s results were discussed with the patient in detail.     PLAN:  Patient was counseled on good communication strategies, hearing loss and impact on communication.  Handout on good communication strategies was given to patient. It is recommended that the patient return to monitor hearing if changes are noted or new ear symptoms arise. Please call this clinic with questions regarding these results or recommendations.        Iman Javed.  Licensed Audiologist  [place] # [id number]

## 2024-03-27 ENCOUNTER — VIRTUAL VISIT (OUTPATIENT)
Dept: PSYCHOLOGY | Facility: CLINIC | Age: 34
End: 2024-03-27
Attending: FAMILY MEDICINE
Payer: COMMERCIAL

## 2024-03-27 DIAGNOSIS — F41.1 GAD (GENERALIZED ANXIETY DISORDER): ICD-10-CM

## 2024-03-27 DIAGNOSIS — F33.41 RECURRENT MAJOR DEPRESSIVE DISORDER, IN PARTIAL REMISSION (H): ICD-10-CM

## 2024-03-27 DIAGNOSIS — F32.1 CURRENT MODERATE EPISODE OF MAJOR DEPRESSIVE DISORDER, UNSPECIFIED WHETHER RECURRENT (H): Primary | ICD-10-CM

## 2024-03-27 PROCEDURE — 90791 PSYCH DIAGNOSTIC EVALUATION: CPT | Mod: 95 | Performed by: STUDENT IN AN ORGANIZED HEALTH CARE EDUCATION/TRAINING PROGRAM

## 2024-03-27 ASSESSMENT — ANXIETY QUESTIONNAIRES
6. BECOMING EASILY ANNOYED OR IRRITABLE: MORE THAN HALF THE DAYS
IF YOU CHECKED OFF ANY PROBLEMS ON THIS QUESTIONNAIRE, HOW DIFFICULT HAVE THESE PROBLEMS MADE IT FOR YOU TO DO YOUR WORK, TAKE CARE OF THINGS AT HOME, OR GET ALONG WITH OTHER PEOPLE: VERY DIFFICULT
7. FEELING AFRAID AS IF SOMETHING AWFUL MIGHT HAPPEN: MORE THAN HALF THE DAYS
7. FEELING AFRAID AS IF SOMETHING AWFUL MIGHT HAPPEN: MORE THAN HALF THE DAYS
2. NOT BEING ABLE TO STOP OR CONTROL WORRYING: MORE THAN HALF THE DAYS
8. IF YOU CHECKED OFF ANY PROBLEMS, HOW DIFFICULT HAVE THESE MADE IT FOR YOU TO DO YOUR WORK, TAKE CARE OF THINGS AT HOME, OR GET ALONG WITH OTHER PEOPLE?: VERY DIFFICULT
GAD7 TOTAL SCORE: 12
1. FEELING NERVOUS, ANXIOUS, OR ON EDGE: NEARLY EVERY DAY
GAD7 TOTAL SCORE: 12
GAD7 TOTAL SCORE: 12
3. WORRYING TOO MUCH ABOUT DIFFERENT THINGS: MORE THAN HALF THE DAYS
6. BECOMING EASILY ANNOYED OR IRRITABLE: MORE THAN HALF THE DAYS
3. WORRYING TOO MUCH ABOUT DIFFERENT THINGS: MORE THAN HALF THE DAYS
5. BEING SO RESTLESS THAT IT IS HARD TO SIT STILL: NOT AT ALL
1. FEELING NERVOUS, ANXIOUS, OR ON EDGE: NEARLY EVERY DAY
IF YOU CHECKED OFF ANY PROBLEMS ON THIS QUESTIONNAIRE, HOW DIFFICULT HAVE THESE PROBLEMS MADE IT FOR YOU TO DO YOUR WORK, TAKE CARE OF THINGS AT HOME, OR GET ALONG WITH OTHER PEOPLE: VERY DIFFICULT
5. BEING SO RESTLESS THAT IT IS HARD TO SIT STILL: NOT AT ALL
7. FEELING AFRAID AS IF SOMETHING AWFUL MIGHT HAPPEN: MORE THAN HALF THE DAYS
7. FEELING AFRAID AS IF SOMETHING AWFUL MIGHT HAPPEN: MORE THAN HALF THE DAYS
4. TROUBLE RELAXING: SEVERAL DAYS
4. TROUBLE RELAXING: SEVERAL DAYS
GAD7 TOTAL SCORE: 12
8. IF YOU CHECKED OFF ANY PROBLEMS, HOW DIFFICULT HAVE THESE MADE IT FOR YOU TO DO YOUR WORK, TAKE CARE OF THINGS AT HOME, OR GET ALONG WITH OTHER PEOPLE?: VERY DIFFICULT
2. NOT BEING ABLE TO STOP OR CONTROL WORRYING: MORE THAN HALF THE DAYS

## 2024-03-27 ASSESSMENT — PATIENT HEALTH QUESTIONNAIRE - PHQ9
SUM OF ALL RESPONSES TO PHQ QUESTIONS 1-9: 15
10. IF YOU CHECKED OFF ANY PROBLEMS, HOW DIFFICULT HAVE THESE PROBLEMS MADE IT FOR YOU TO DO YOUR WORK, TAKE CARE OF THINGS AT HOME, OR GET ALONG WITH OTHER PEOPLE: VERY DIFFICULT
SUM OF ALL RESPONSES TO PHQ QUESTIONS 1-9: 15

## 2024-03-27 NOTE — PROGRESS NOTES
"    [facility]ing      PATIENT'S NAME: Craig Gusman  PREFERRED NAME: Balbir  PRONOUNS: she/her  MRN: [id number]  : 1990  ADDRESS: [address] [place] [address]  ACCT. NUMBER:  [id number]  DATE OF SERVICE: 3/27/24  START TIME: 11.00  END TIME: 11.52  PREFERRED PHONE: [phone]  May we leave a program related message: Yes  EMERGENCY CONTACT: was obtained Za( [phone]).  SERVICE MODALITY:  Video Visit:      Provider verified identity through the following two step process.  Patient provided:  Patient photo, Patient , and Patient address    Telemedicine Visit: The patient's condition can be safely assessed and treated via synchronous audio and visual telemedicine encounter.      Reason for Telemedicine Visit: Patient has requested telehealth visit    Originating Site (Patient Location): Patient's home    Distant Site (Provider Location): Provider Remote Setting- Home Office    Consent:  The patient/guardian has verbally consented to: the potential risks and benefits of telemedicine (video visit) versus in person care; bill my insurance or make self-payment for services provided; and responsibility for payment of non-covered services.     Patient would like the video invitation sent by:  My Chart    Mode of Communication:  Video Conference via [facility]    Distant Location (Provider):  Off-site    As the provider I attest to compliance with applicable laws and regulations related to telemedicine.    UNIVERSAL ADULT Mental Health DIAGNOSTIC ASSESSMENT    Identifying Information:  Patient is a 33 year old,   individual.  Patient was referred for an assessment by primary care clinic.  Patient attended the session alone.    Chief Complaint:   The reason for seeking services at this time is: \"Depression, some anxiety\". 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 Patient noted living in uncertainty with \"no motivation to do little basic things\" Patient added that his wife had a miscarriage last October and they have been struggling with having a pregnancy since they got  about 5 years ago.  The problem(s) began 06/01/05.    Patient has not attempted to resolve these concerns in the past.    Social/Family History:  Patient reported they grew up in [place] [place]  .  They were raised by biological parents  .  Parents were always together.  Patient reported that their childhood was supportive but stressful at time as \"mum was very strict\" and that at 13 he lost the Zoroastrian perez that he was brought in and that created some friction but overall supportive..  Patient described their current relationships with family of origin as  \"great\".     The patient describes their cultural background as .  Cultural influences and impact on patient's life structure, values, norms, and healthcare: Very strict Zoroastrian mother.  Contextual influences on patient's health include: Contextual Factors: Economic Factors relating to employment  .    These factors will be addressed in the Preliminary Treatment plan. Patient identified their preferred language to be English. Patient reported they does not need the assistance of an  or other support involved in therapy.     Patient reported had no significant delays in developmental tasks.   Patient's highest education level was graduate school  .  Patient identified the following learning problems: none reported.  Modifications will not be used to assist communication in therapy.  Patient reports they are  able to understand written materials.    Patient reported the following relationship history that this is his first wife.  Patient's current relationship status is  for about 5 years.   Patient identified their sexual orientation as heterosexual.  " Patient reported having   no child(vashti). Patient identified partner; parents; siblings; friends as part of their support system.  Patient identified the quality of these relationships as good,  .      Patient's current living/housing situation involves staying in own home/apartment.  The immediate members of family and household include Jocelynn Easton, 33,Wife  and they report that housing is stable.    Patient is currently unemployed.  Patient reports their finances are obtained through spouse. Patient does not identify finances as a current stressor.      Patient reported that they have not been involved with the legal system.    . Patient does not report being under probation/ parole/ jurisdiction. They are not under any current court jurisdiction. .    Patient's Strengths and Limitations:  Patient identified the following strengths or resources that will help them succeed in treatment: commitment to health and well being, friends / good social support, motivation, and work ethic. Things that may interfere with the patient's success in treatment include: none identified.     Assessments:  The following assessments were completed by patient for this visit:  PHQ9:       5/15/2019     6:50 PM 10/20/2020     2:23 PM 9/13/2022     3:44 PM 12/13/2023    12:29 PM 3/27/2024    10:34 AM   PHQ-9 SCORE   PHQ-9 Total Score MyChart  13 (Moderate depression) 9 (Mild depression) 13 (Moderate depression) 15 (Moderately severe depression)   PHQ-9 Total Score 12 13 9 13 15     GAD7:       5/15/2019     6:50 PM 10/20/2020     2:23 PM 3/27/2024    10:46 AM   MALVIN-7 SCORE   Total Score  9 (mild anxiety) 12 (moderate anxiety)   Total Score 9 9 12    12     PROMIS 10-Global Health (all questions and answers displayed):       3/27/2024    10:47 AM   PROMIS 10   In general, would you say your health is: Good   In general, would you say your quality of life is: Good   In general, how would you rate your physical health? Good    In general, how would you rate your mental health, including your mood and your ability to think? Fair   In general, how would you rate your satisfaction with your social activities and relationships? Good   In general, please rate how well you carry out your usual social activities and roles Fair   To what extent are you able to carry out your everyday physical activities such as walking, climbing stairs, carrying groceries, or moving a chair? Completely   In the past 7 days, how often have you been bothered by emotional problems such as feeling anxious, depressed, or irritable? Often   In the past 7 days, how would you rate your fatigue on average? Moderate   In the past 7 days, how would you rate your pain on average, where 0 means no pain, and 10 means worst imaginable pain? 2   In general, would you say your health is: 3   In general, would you say your quality of life is: 3   In general, how would you rate your physical health? 3   In general, how would you rate your mental health, including your mood and your ability to think? 2   In general, how would you rate your satisfaction with your social activities and relationships? 3   In general, please rate how well you carry out your usual social activities and roles. (This includes activities at home, at work and in your community, and responsibilities as a parent, child, spouse, employee, friend, etc.) 2   To what extent are you able to carry out your everyday physical activities such as walking, climbing stairs, carrying groceries, or moving a chair? 5   In the past 7 days, how often have you been bothered by emotional problems such as feeling anxious, depressed, or irritable? 4   In the past 7 days, how would you rate your fatigue on average? 3   In the past 7 days, how would you rate your pain on average, where 0 means no pain, and 10 means worst imaginable pain? 2   Global Mental Health Score 10    10   Global Physical Health Score 15    15   PROMIS TOTAL  - SUBSCORES 25    25       Personal and Family Medical History:  Patient does not report a family history of mental health concerns.  Patient reports family history includes Coronary Artery Disease Early Onset in his maternal grandfather; Crohn's Disease in his sister; Hyperlipidemia in his father; Lung Cancer in his paternal grandfather; No Known Problems in his paternal grandmother..     Patient does report Mental Health Diagnosis and/or Treatment.  Patient Patient reported the following previous diagnoses which include(s): Depression.  Patient reported symptoms began 2005.   Patient has received mental health services in the past: therapy with a private practice provider  .  Psychiatric Hospitalizations: None.  Patient denies a history of civil commitment.  Patient is not receiving other mental health services.  These include none.       Patient has had a physical exam to rule out medical causes for current symptoms.  Date of last physical exam was within the past year. Client was encouraged to follow up with PCP if symptoms were to develop. The patient has a [facility] Primary Care Provider, who is named Darwin Park.  Patient reports no current medical concerns and no current dental concerns.  Patient denies any issues with pain..   There are not significant appetite / nutritional concerns / weight changes.   Patient does not report a history of head injury / trauma / cognitive impairment.      Patient reports current meds as:   Bupropion and Adderal medications have been marked as taking for the 3/27/24 encounter (Virtual Visit) with Darrel Yoder LGSW.       Medication Adherence:  Patient reports taking.  taking prescribed medications as prescribed.    Patient Allergies:  No Known Allergies    Medical History:    Past Medical History:   Diagnosis Date    Major depressive disorder, recurrent episode, in partial remission (H24)          Current Mental Status Exam:   Appearance:  Appropriate    Eye  "Contact:  Good   Psychomotor:  Normal       Gait / station:  no problem  Attitude / Demeanor: Cooperative  Interested Friendly  Speech      Rate / Production: Normal/ Responsive      Volume:  Normal  volume      Language:  good  Mood:   Anxious  Depressed   Affect:   Appropriate    Thought Content: Clear   Thought Process: Coherent  Goal Directed       Associations: No loosening of associations  Insight:   Good   Judgment:  Good   Orientation:  All  Attention/concentration: Good    Substance Use:   Patient did not report a family history of substance use concerns; see medical history section for details.  Patient has not received chemical dependency treatment in the past.  Patient has not ever been to detox.      Patient is not currently receiving any chemical dependency treatment.           Substance History of use Age of first use Date of last use     Pattern and duration of use (include amounts and frequency)   Alcohol used in the past   18 02/14/24 Reported drinking about 2 or 3 drinks a months socially.   Cannabis   currently use 18 03/26/24 Reported smoking \"most days of the weeks\" \"      Amphetamines   currently use   03/27/24 Reported taking prescription Adderral   Cocaine/crack    never used       REPORTS SUBSTANCE USE: N/A   Hallucinogens used in the past   23 08/21/19  REPORTS SUBSTANCE USE: N/A   Inhalants never used         REPORTS SUBSTANCE USE: N/A   Heroin never used         REPORTS SUBSTANCE USE: N/A   Other Opiates never used     REPORTS SUBSTANCE USE: N/A   Benzodiazepine   never used     REPORTS SUBSTANCE USE: N/A   Barbiturates never used     REPORTS SUBSTANCE USE: N/A   Over the counter meds used in the past 18 02/01/23 REPORTS SUBSTANCE USE: N/A   Caffeine currently use 15   REPORTS SUBSTANCE USE: N/A   Nicotine  currently use 21 03/27/24 Reported vaping daily.   Other substances not listed above:  Identify:  never used     REPORTS SUBSTANCE USE: N/A     Patient reported the following " problems as a result of their substance use: no problems, not applicable.    Substance Use: daily use and cravings/urges to use    Based on the negative CAGE score and clinical interview there  are not indications of drug or alcohol abuse.    Significant Losses / Trauma / Abuse / Neglect Issues:   Patient did not  serve in the .  There are indications or report of significant loss, trauma, abuse or neglect issues related to: are no indications and client denies any losses, trauma, abuse, or neglect concerns.  Concerns for possible neglect are not present.     Safety Assessment:   Patient denies current homicidal ideation and behaviors.  Patient denies current self-injurious ideation and behaviors.    Patient denied risk behaviors associated with substance use.   Patient denies any high risk behaviors associated with mental health symptoms.  Patient reports the following current concerns for their personal safety: None.  Patient reports there are not firearms in the house.       .    History of Safety Concerns:  Patient denied a history of homicidal ideation.     Patient denied a history of personal safety concerns.    Patient denied a history of assaultive behaviors.    Patient denied a history of sexual assault behaviors.     Patient denied a history of risk behaviors associated with substance use.  Patient denies any history of high risk behaviors associated with mental health symptoms.  Patient reports the following protective factors: dedication to family or friends; safe and stable environment; living with other people; healthy fear of risky behaviors or pain; access to a variety of clinical interventions and pets    Risk Plan:  See Recommendations for Safety and Risk Management Plan    Review of Symptoms per patient report:   Depression: Lack of interest, Change in energy level, Difficulties concentrating, Low self-worth, and Feeling sad, down, or depressed  Kim:  No Symptoms  Psychosis: No  Symptoms  Anxiety: Excessive worry, Nervousness, and Poor concentration  Panic:  No symptoms  Post Traumatic Stress Disorder:  No Symptoms   Eating Disorder: No Symptoms  ADD / ADHD:  Inattentive, Poor organizational skills, Distractibility, Restlessness/fidgety, and Hyperactive  Conduct Disorder: No symptoms  Autism Spectrum Disorder: No symptoms  Obsessive Compulsive Disorder: Picking on his acne     Patient reports the following compulsive behaviors and treatment history: none reported .      Diagnostic Criteria:   Generalized Anxiety Disorder  A. Excessive anxiety and worry about a number of events or activities (such as work or school performance).   B. The person finds it difficult to control the worry.   - Restlessness or feeling keyed up or on edge.    - Being easily fatigued.    - Difficulty concentrating or mind going blank.   D. The focus of the anxiety and worry is not confined to features of an Axis I disorder.  E. The anxiety, worry, or physical symptoms cause clinically significant distress or impairment in social, occupational, or other important areas of functioning.   F. The disturbance is not due to the direct physiological effects of a substance (e.g., a drug of abuse, a medication) or a general medical condition (e.g., hyperthyroidism) and does not occur exclusively during a Mood Disorder, a Psychotic Disorder, or a Pervasive Developmental Disorder.    - The aformentioned symptoms began 2 month(s) ago and occurs 7 days per week and is experienced as mild. Major Depressive Disorder   - Depressed mood. Note: In children and adolescents, can be irritable mood.     - Diminished interest or pleasure in all, or almost all, activities.    - Psychomotor activity retardation.    - Fatigue or loss of energy.    - Feelings of worthlessness or inappropriate guilt.    - Diminished ability to think or concentrate, or indecisiveness.   B) The symptoms cause clinically significant distress or impairment in  social, occupational, or other important areas of functioning  C) The episode is not attributable to the physiological effects of a substance or to another medical condition  D) The occurence of major depressive episode is not better explained by other thought / psychotic disorders  E) There has never been a manic episode or hypomanic episode    Functional Status:  Patient reports the following functional impairments:  health maintenance, management of the household and or completion of tasks, relationship(s), self-care, and work / vocational responsibilities.     Nonprogrammatic care:  Patient is requesting basic services to address current mental health concerns.    Clinical Summary:  1. Psychosocial, Cultural and Contextual Factors: Unemployment/ difficulty with having a baby  .  2. Principal DSM5 Diagnoses  (Sustained by DSM5 Criteria Listed Above):   296.22 (F32.1)  Major Depressive Disorder, Single Episode, Moderate _ and With atypical features  300.02 (F41.1) Generalized Anxiety Disorder.  3. Other Diagnoses that is relevant to services:   Attention-Deficit/Hyperactivity Disorder  314.01 (F90.2) Combined presentation.  4. Provisional Diagnosis:  698.4 (L98.1) Excoriation (skin picking) Disorder as evidenced by reported urges to pick acne .  5. Prognosis: Expect Improvement.  6. Likely consequences of symptoms if not treated: Current condition could deteriorate causing adverse health problems with a negative impact on  pt's psychosocial functioning. .  7. Client strengths include:  educated, empathetic, goal-focused, and support of family, friends and providers .     Recommendations:     1. Plan for Safety and Risk Management:   Safety and Risk: Recommended that patient call 911 or go to the local ED should there be a change in any of these risk factors..          Report to child / adult protection services was NA.     2. Patient's identified nothing at this time.     3. Initial Treatment will focus on:     Depressed Mood -   Improving mood and motivation by introducing behavior activation interventions ;  identifying layers of unhelpful thinking and changing them.      4. Resources/Service Plan:    services are not indicated.   Modifications to assist communication are not indicated.   Additional disability accommodations are not indicated.      5. Collaboration:   Collaboration / coordination of treatment will be initiated with the following  support professionals: none at this time.      6.  Referrals:   The following referral(s) will be initiated: none at this time.       A Release of Information has been obtained for the following: NA.     Clinical Substantiation/medical necessity for the above recommendations:  NA.    7. ISREAL:    ISREAL:  Discussed the general effects of drugs and alcohol on health and well-being.   8. Records:   These were reviewed at time of assessment.   Information in this assessment was obtained from the medical record and  provided by patient who is a good historian.    Patient will have open access to their mental health medical record.    9.   Interactive Complexity: No    10. Safety Plan:     Provider Name/ Credentials:  AZIZA Floyd  March 27, 2024    ----- Service Performed and Documented by AZIZA------  This note was reviewed and clinical supervision by CLAIR [facility]    4/1/2024         Answers submitted by the patient for this visit:  Patient Health Questionnaire (Submitted on 3/27/2024)  If you checked off any problems, how difficult have these problems made it for you to do your work, take care of things at home, or get along with other people?: Very difficult  PHQ9 TOTAL SCORE: 15  MALVIN-7 (Submitted on 3/27/2024)  MALVIN 7 TOTAL SCORE: 12

## 2024-04-03 ENCOUNTER — VIRTUAL VISIT (OUTPATIENT)
Dept: PSYCHOLOGY | Facility: CLINIC | Age: 34
End: 2024-04-03
Payer: COMMERCIAL

## 2024-04-03 DIAGNOSIS — F32.1 CURRENT MODERATE EPISODE OF MAJOR DEPRESSIVE DISORDER, UNSPECIFIED WHETHER RECURRENT (H): Primary | ICD-10-CM

## 2024-04-03 PROCEDURE — 90834 PSYTX W PT 45 MINUTES: CPT | Mod: 95 | Performed by: STUDENT IN AN ORGANIZED HEALTH CARE EDUCATION/TRAINING PROGRAM

## 2024-04-03 NOTE — PROGRESS NOTES
"[facility]                                     Progress Note    Patient Name: Craig Gusman  Date: 04/03/2024         Service Type: Individual      Session Start Time: 12.00  Session End Time: 12.45     Session Length: 02    Session #: 38-52    Attendees: Client attended alone    Service Modality:  Video Visit:      Provider verified identity through the following two step process.  Patient provided:  Patient is known previously to provider    Telemedicine Visit: The patient's condition can be safely assessed and treated via synchronous audio and visual telemedicine encounter.      Reason for Telemedicine Visit: Patient has requested telehealth visit    Originating Site (Patient Location): Patient's home    Distant Site (Provider Location): Provider Remote Setting- Home Office    Consent:  The patient/guardian has verbally consented to: the potential risks and benefits of telemedicine (video visit) versus in person care; bill my insurance or make self-payment for services provided; and responsibility for payment of non-covered services.     Patient would like the video invitation sent by:  My Chart    Mode of Communication:  Video Conference via Amwell    Distant Location (Provider):  Off-site    As the provider I attest to compliance with applicable laws and regulations related to telemedicine.    DATA  Interactive Complexity: No  Crisis: No        Progress Since Last Session (Related to Symptoms / Goals / Homework):   Symptoms: No change pt reported no change    Homework: Completed in session review patient's  goal for treatment and collaboratively develop treatment plan.       Episode of Care Goals: Minimal progress - ACTION (Actively working towards change); Intervened by reinforcing change plan / affirming steps taken     Current / Ongoing Stressors and Concerns:   The reason for seeking services at this time is: \"Depression, some anxiety\". Patient shared that he lost his job during " "COVID and has been on unemployment for a while and then got a new job with the [employer] and has not been able to start because background check is pending for months now. Patient noted living in uncertainty with \"no motivation to do little basic things\" Patient added that his wife had a miscarriage last October and they have been struggling with having a pregnancy since they got  about 5 years ago.        Treatment Objective(s) Addressed in This Session:   Decrease frequency and intensity of feeling down, depressed, hopeless  Patient will increase daily activity level by exercising for 20-30 minutes and participate in at least 1 daily pleasant/fun activities.     Intervention:   CBT: Discussed how lack of motivation and activity can result to depression leading to a cut in activities, and a neglect of daily tasks and responsibilities. Explore how to change current depressive feeling using behavior activation intervention: increase activity level, fun and achievement. Provider encouraged patient to start with small easy steps, beginning with things that are simple and achievable. Provided a weekly activity schedule for patient to track daily task and rate their depression, pleasant feelings, and sense of achievement before and after the activity.     Assessments completed prior to visit:  The following assessments were completed by patient for this visit:  PHQ9:       5/15/2019     6:50 PM 10/20/2020     2:23 PM 9/13/2022     3:44 PM 12/13/2023    12:29 PM 3/27/2024    10:34 AM   PHQ-9 SCORE   PHQ-9 Total Score MyChart  13 (Moderate depression) 9 (Mild depression) 13 (Moderate depression) 15 (Moderately severe depression)   PHQ-9 Total Score 12 13 9 13 15     GAD7:       5/15/2019     6:50 PM 10/20/2020     2:23 PM 3/27/2024    10:46 AM   MALVIN-7 SCORE   Total Score  9 (mild anxiety) 12 (moderate anxiety)   Total Score 9 9 12    12          ASSESSMENT: Current Emotional / Mental Status (status of " significant symptoms):   Risk status (Self / Other harm or suicidal ideation)   Patient denies current fears or concerns for personal safety.   Patient denies current or recent suicidal ideation or behaviors.   Patient denies current or recent homicidal ideation or behaviors.   Patient denies current or recent self injurious behavior or ideation.   Patient denies other safety concerns.   Patient reports there has been no change in risk factors since their last session.     Patient reports there has been no change in protective factors since their last session.     Recommended that patient call 911 or go to the local ED should there be a change in any of these risk factors.     Appearance:   Appropriate    Eye Contact:   Good    Psychomotor Behavior: Normal    Attitude:   Cooperative  Interested   Orientation:   All   Speech    Rate / Production: Normal/ Responsive    Volume:  Normal    Mood:    Depressed    Affect:    Appropriate    Thought Content:  Clear    Thought Form:  Coherent  Goal Directed    Insight:    Good      Medication Review:   No changes to current psychiatric medication(s)     Medication Compliance:   Yes     Changes in Health Issues:   None reported     Chemical Use Review:   Substance Use: Chemical use reviewed, no active concerns identified      Tobacco Use: No change in amount of tobacco use since last session.  Patient assessed present costs and future losses as a result of smoking    Diagnosis:  296.32 (F33.1) Major Depressive Disorder, Recurrent Episode, Moderate _ and With atypical features    Collateral Reports Completed:   Not Applicable    PLAN: (Patient Tasks / Therapist Tasks / Other)    Increase daily physical and achievement  activities and use provided daily log to rate your depression, pleasant feelings, and sense of achievement BEFORE  and AFTER the activity.      AZIZA Floyd                                                       ----- Service Performed and Documented by  "SW------  This note was reviewed and clinical supervision by CLAIR [facility]    4/4/2024   ______________________________________________________________________    Individual Treatment Plan    Patient's Name: Craig Gusman  YOB: 1990    Date of Creation: 04/03/2024  Date Treatment Plan Last Reviewed/Revised:     DSM5 Diagnoses: 296.32 (F33.1) Major Depressive Disorder, Recurrent Episode, Moderate _ and With atypical features  Psychosocial / Contextual Factors: unemployment  PROMIS (reviewed every 90 days):     Referral / Collaboration:  Referral to another professional/service is not indicated at this time..    Anticipated number of session for this episode of care:  15-25  Anticipation frequency of session: Biweekly  Anticipated Duration of each session: 38-52 minutes  Treatment plan will be reviewed in 90 days or when goals have been changed.       MeasurableTreatment Goal(s) related to diagnosis / functional impairment(s)    Goal 1: Patient will identify and address specific triggers to feelings of depression and improve coping by  90 % in the next three months.      I will know I've met my goal when I am no longer in the [place] that I am in right now and less depressed and not putting a lot of mental efforts into basic activities\"     Objective #A (Patient Action)    Patient will increase daily activity level by exercising for 20-30 minutes and participate in at least 1 daily pleasant/fun activities.  Status: Continued - Date(s): 07/03/2024    Intervention(s)  Therapist will provide psychoeducation, behavioral activation, and cognitive restructuring.    Objective #B  Patient will identify specific areas of cognitive distortion and challenge irrational thoughts with reality   Status: Continued - Date(s): 07/03/2024       Intervention(s)  Therapist will provide psychoeducation, behavioral activation, and cognitive restructuring.    Objective #A (Patient Action)    Patient will " maintain a regular sleep and wake pattern daily.  Status: Continued - Date(s): 07/03/2024    Intervention(s)  Therapist will provide psychoeducation, behavioral activation, and cognitive restructuring.    Objective #C  Patient will learn and practice relaxation techniques to manage depression.  Status: Continued - Date(s):  07/03/2024      Intervention(s)  Therapist will provide psychoeducation, behavioral activation, and cognitive restructuring.      Patient has reviewed and agreed to the above plan.      AZIZA Floyd  April 3, 2024    Answers submitted by the patient for this visit:  MALVIN-7 (Submitted on 3/27/2024)  MALVIN 7 TOTAL SCORE: 12

## 2024-04-11 ENCOUNTER — VIRTUAL VISIT (OUTPATIENT)
Dept: PSYCHOLOGY | Facility: CLINIC | Age: 34
End: 2024-04-11
Payer: COMMERCIAL

## 2024-04-11 DIAGNOSIS — F32.1 CURRENT MODERATE EPISODE OF MAJOR DEPRESSIVE DISORDER, UNSPECIFIED WHETHER RECURRENT (H): Primary | ICD-10-CM

## 2024-04-11 PROCEDURE — 90834 PSYTX W PT 45 MINUTES: CPT | Mod: 95 | Performed by: STUDENT IN AN ORGANIZED HEALTH CARE EDUCATION/TRAINING PROGRAM

## 2024-04-11 NOTE — PROGRESS NOTES
"[facility]                                     Progress Note    Patient Name: Craig Gusman  Date: 04/11/2024         Service Type: Individual      Session Start Time: 9.00  Session End Time: 9.45     Session Length: 03    Session #: 38-52    Attendees: Client attended alone    Service Modality:  Video Visit:      Provider verified identity through the following two step process.  Patient provided:  Patient is known previously to provider    Telemedicine Visit: The patient's condition can be safely assessed and treated via synchronous audio and visual telemedicine encounter.      Reason for Telemedicine Visit: Patient has requested telehealth visit    Originating Site (Patient Location): Patient's home    Distant Site (Provider Location): Provider Remote Setting- Home Office    Consent:  The patient/guardian has verbally consented to: the potential risks and benefits of telemedicine (video visit) versus in person care; bill my insurance or make self-payment for services provided; and responsibility for payment of non-covered services.     Patient would like the video invitation sent by:  My Chart    Mode of Communication:  Video Conference via Amwell    Distant Location (Provider):  Off-site    As the provider I attest to compliance with applicable laws and regulations related to telemedicine.    DATA  Interactive Complexity: No  Crisis: No        Progress Since Last Session (Related to Symptoms / Goals / Homework):   Symptoms: No change pt reported no change    Homework: Completed in session review patient's  goal for treatment and collaboratively develop treatment plan.       Episode of Care Goals: Minimal progress - ACTION (Actively working towards change); Intervened by reinforcing change plan / affirming steps taken     Current / Ongoing Stressors and Concerns:   The reason for seeking services at this time is: \"Depression, some anxiety\". 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 Patient noted living in uncertainty with \"no motivation to do little basic things\" Patient added that his wife had a miscarriage last October and they have been struggling with having a pregnancy since they got  about 5 years ago.        Current: Today, patient reported that he continues to experience  neurovegetative symptoms of depression in the form of low motivation, low energy, low mood and noted recurrent sleep problems adversely affecting psychosocial functioning.      Treatment Objective(s) Addressed in This Session:   Decrease frequency and intensity of feeling down, depressed, hopeless  Patient will increase daily activity level by exercising for 20-30 minutes and participate in at least 1 daily pleasant/fun activities.     Intervention:   Psychoeducation: Discussed the different factors that may impact good sleep. Discussed unhealthy sleep habits  like napping, unhealthy bedtime routines, use of mobile phones late at night; and how all these could lead to sleeplessness and poor daytime functioning. Provider provided patient with healthy sleep hygiene tips to practice: maintaining a specific sleep time daily, sleep when sleepy, avoid caffeine and alcohol close to bedtime, and avoid daytime naps.Encourage patient to take daily walks outside to increase melatonin production etc.     Assessments completed prior to visit:  The following assessments were completed by patient for this visit:  PHQ9:       5/15/2019     6:50 PM 10/20/2020     2:23 PM 9/13/2022     3:44 PM 12/13/2023    12:29 PM 3/27/2024    10:34 AM   PHQ-9 SCORE   PHQ-9 Total Score MyChart  13 (Moderate depression) 9 (Mild depression) 13 (Moderate depression) 15 (Moderately severe depression)   PHQ-9 Total Score 12 13 9 13 15     GAD7:       5/15/2019     6:50 " PM 10/20/2020     2:23 PM 3/27/2024    10:46 AM   MALVIN-7 SCORE   Total Score  9 (mild anxiety) 12 (moderate anxiety)   Total Score 9 9 12    12          ASSESSMENT: Current Emotional / Mental Status (status of significant symptoms):   Risk status (Self / Other harm or suicidal ideation)   Patient denies current fears or concerns for personal safety.   Patient denies current or recent suicidal ideation or behaviors.   Patient denies current or recent homicidal ideation or behaviors.   Patient denies current or recent self injurious behavior or ideation.   Patient denies other safety concerns.   Patient reports there has been no change in risk factors since their last session.     Patient reports there has been no change in protective factors since their last session.     Recommended that patient call 911 or go to the local ED should there be a change in any of these risk factors.     Appearance:   Appropriate    Eye Contact:   Good    Psychomotor Behavior: Normal    Attitude:   Cooperative  Interested   Orientation:   All   Speech    Rate / Production: Normal/ Responsive    Volume:  Normal    Mood:    Depressed    Affect:    Appropriate    Thought Content:  Clear    Thought Form:  Coherent  Goal Directed    Insight:    Good      Medication Review:   No changes to current psychiatric medication(s)     Medication Compliance:   Yes     Changes in Health Issues:   None reported     Chemical Use Review:   Substance Use: Chemical use reviewed, no active concerns identified      Tobacco Use: No change in amount of tobacco use since last session.  Patient assessed present costs and future losses as a result of smoking    Diagnosis:  296.32 (F33.1) Major Depressive Disorder, Recurrent Episode, Moderate _ and With atypical features    Collateral Reports Completed:   Not Applicable    PLAN: (Patient Tasks / Therapist Tasks / Other)    Increase daily physical and achievement  activities and use provided daily log to rate your  "depression, pleasant feelings, and sense of achievement BEFORE  and AFTER the activity.  Practice sleep hygiene tips discussed in session    AZIZA Floyd                  ----- Service Performed and Documented by AZIZA------  This note was reviewed and clinical supervision by CLAIR [facility]    4/12/2024                                        ______________________________________________________________________    Individual Treatment Plan    Patient's Name: Craig Gusman  YOB: 1990    Date of Creation: 04/03/2024  Date Treatment Plan Last Reviewed/Revised:     DSM5 Diagnoses: 296.32 (F33.1) Major Depressive Disorder, Recurrent Episode, Moderate _ and With atypical features  Psychosocial / Contextual Factors: unemployment  PROMIS (reviewed every 90 days):     Referral / Collaboration:  Referral to another professional/service is not indicated at this time..    Anticipated number of session for this episode of care:  15-25  Anticipation frequency of session: Biweekly  Anticipated Duration of each session: 38-52 minutes  Treatment plan will be reviewed in 90 days or when goals have been changed.       MeasurableTreatment Goal(s) related to diagnosis / functional impairment(s)    Goal 1: Patient will identify and address specific triggers to feelings of depression and improve coping by  90 % in the next three months.      I will know I've met my goal when I am no longer in the [place] that I am in right now and less depressed and not putting a lot of mental efforts into basic activities\"     Objective #A (Patient Action)    Patient will increase daily activity level by exercising for 20-30 minutes and participate in at least 1 daily pleasant/fun activities.  Status: Continued - Date(s): 07/03/2024    Intervention(s)  Therapist will provide psychoeducation, behavioral activation, and cognitive restructuring.    Objective #B  Patient will identify specific areas of cognitive distortion and " challenge irrational thoughts with reality   Status: Continued - Date(s): 07/03/2024       Intervention(s)  Therapist will provide psychoeducation, behavioral activation, and cognitive restructuring.    Objective #A (Patient Action)    Patient will maintain a regular sleep and wake pattern daily.  Status: Continued - Date(s): 07/03/2024    Intervention(s)  Therapist will provide psychoeducation, behavioral activation, and cognitive restructuring.    Objective #C  Patient will learn and practice relaxation techniques to manage depression.  Status: Continued - Date(s):  07/03/2024      Intervention(s)  Therapist will provide psychoeducation, behavioral activation, and cognitive restructuring.      Patient has reviewed and agreed to the above plan.      AZIZA Floyd  April 3, 2024    Answers submitted by the patient for this visit:  MALVIN-7 (Submitted on 3/27/2024)  MALVIN 7 TOTAL SCORE: 12

## 2024-04-22 ENCOUNTER — OFFICE VISIT (OUTPATIENT)
Dept: UROLOGY | Facility: CLINIC | Age: 34
End: 2024-04-22
Payer: COMMERCIAL

## 2024-04-22 VITALS
HEIGHT: 71 IN | BODY MASS INDEX: 23.1 KG/M2 | WEIGHT: 165 LBS | HEART RATE: 76 BPM | SYSTOLIC BLOOD PRESSURE: 130 MMHG | DIASTOLIC BLOOD PRESSURE: 92 MMHG

## 2024-04-22 DIAGNOSIS — N39.3 MALE URINARY STRESS INCONTINENCE: ICD-10-CM

## 2024-04-22 DIAGNOSIS — N39.43 URINARY INCONTINENCE, POST-VOID DRIBBLING: ICD-10-CM

## 2024-04-22 DIAGNOSIS — N39.3 STRESS INCONTINENCE: Primary | ICD-10-CM

## 2024-04-22 PROCEDURE — 52000 CYSTOURETHROSCOPY: CPT | Performed by: STUDENT IN AN ORGANIZED HEALTH CARE EDUCATION/TRAINING PROGRAM

## 2024-04-22 RX ORDER — LIDOCAINE HYDROCHLORIDE 20 MG/ML
JELLY TOPICAL ONCE
Status: ACTIVE | OUTPATIENT
Start: 2024-04-22

## 2024-04-22 ASSESSMENT — PAIN SCALES - GENERAL: PAINLEVEL: NO PAIN (0)

## 2024-04-22 NOTE — LETTER
4/22/2024       RE: Craig Gusman  [address] [place] [address]     Dear Colleague,    Thank you for referring your patient, Craig Gusman, to the [facility] [place] at [facility]. Please see a copy of my visit note below.    Male Cystoscopy Procedure Note     PRE-PROCEDURE DIAGNOSIS: urinary incontinence  POST-PROCEDURE DIAGNOSIS: same  PROCEDURE: cystoscopy    HISTORY: Craig Gusman is a 33 year old man with bothersome urinary incontinence, mostly occurring several minutes following voids. Denies stress and urge incontinence. Reports increased frequency. Feels he does not empty completely. Denies history of UTIs.   PVR 0    REVIEW OF OFFICE STUDIES:        DESCRIPTION OF PROCEDURE:  After informed consent was obtained, the patient was brought to the procedure room where he was placed in the supine position with all pressure points well padded.  The penis and scrotum were prepped and draped in a sterile fashion. A flexible cystoscope was introduced through a well-lubricated urethra.  The anterior urethra was free of stricture or diverticulum. The urinary sphincter was intact. Bladder neck was open. Bladder was free of diverticuli, cellules, trabeculation, tumors or stones.The flexible cystoscope was removed and the findings were described to the patient.     ASSESSMENT AND PLAN:  33 year old man with post void incontinence, reports more than just dribbling.   No anatomic abnormality on cystoscopy.  Will plan for referral to pelvic floor PT, as we discussed there was no anatomic reason noted at this time for his incontinence.   He will follow up as needed             Again, thank you for allowing me to participate in the care of your patient.      Sincerely,    aLng Valerio MD

## 2024-04-22 NOTE — NURSING NOTE
"Chief Complaint   Patient presents with    Cystoscopy       Blood pressure (!) 130/92, pulse 76, height 1.791 m (5' 10.5\"), weight 74.8 kg (165 lb). Body mass index is 23.34 kg/m .    Patient Active Problem List   Diagnosis    Chews tobacco       No Known Allergies    Current Outpatient Medications   Medication Sig Dispense Refill    amphetamine-dextroamphetamine (ADDERALL XR) 15 MG 24 hr capsule Take 25 mg by mouth daily      buPROPion (WELLBUTRIN XL) 150 MG 24 hr tablet       clindamycin (CLEOCIN T) 1 % external lotion Apply topically 2 times daily 60 mL 4    loratadine (CLARITIN) 10 MG tablet Take 10 mg by mouth daily as needed for allergies         Social History     Tobacco Use    Smoking status: Every Day     Types: Vaping Device    Smokeless tobacco: Former     Types: Chew    Tobacco comments:     Occasionally   Vaping Use    Vaping status: Every Day    Substances: Nicotine, Flavoring    Devices: Disposable   Substance Use Topics    Alcohol use: Not Currently    Drug use: Yes     Types: Marijuana     Comment: Smoking       Invasive Procedure Safety Checklist:    Procedure: Cystoscopy    Action: Complete sections and checkboxes as appropriate.    Pre-procedure:  1. Patient ID Verified with 2 identifiers (Jennifer and  or MRN) : YES    2. Procedure and site verified with patient/designee (when able) : YES    3. Accurate consent documentation in medical record : YES    4. H&P (or appropriate assessment) documented in medical record : N/A  H&P must be up to 30 days prior to procedure an updated within 24 hours of                 Procedure as applicable.     5. Relevant diagnostic and radiology test results appropriately labeled and displayed as applicable : YES    6. Blood products, implants, devices, and/or special equipment available for the procedure as applicable : YES    7. Procedure site(s) marked with provider initials [Exclusions: none] : NO    8. Marking not required. Reason : Yes  Procedure does not " require site marking    Time Out:     Time-Out performed immediately prior to starting procedure, including verbal and active participation of all team members addressing: YES    1. Correct patient identity.  2. Confirmed that the correct side and site are marked.  3. An accurate procedure to be done.  4. Agreement on the procedure to be done.  5. Correct patient position.  6. Relevant images and results are properly labeled and appropriately displayed.  7. The need to administer antibiotics or fluids for irrigation purposes during the procedure as applicable.  8. Safety precautions based on patient history or medication use.    During Procedure: Verification of correct person, site, and procedure occurs any time the responsibility for care of the patient is transferred to another member of the care team.    The following medication was given:     MEDICATION:  Lidocaine without epinephrine 2% jelly  ROUTE: urethral   SITE: urethral   DOSE: 10 mL  LOT #: [id number]  : [employer]  EXPIRATION DATE: 10-25  NDC#: [address]-1   Was there drug waste? No    Prior to med admin, verified patient identity using patient's name and date of birth.  Due to med administration, patient instructed to remain in clinic for 15 minutes  afterwards, and to report any adverse reaction to me immediately.    Drug Amount Wasted:  None.  Vial/Syringe: Syringe      Yasemin Abiodun  4/22/2024  1:57 PM

## 2024-04-22 NOTE — PROGRESS NOTES
Male Cystoscopy Procedure Note     PRE-PROCEDURE DIAGNOSIS: urinary incontinence  POST-PROCEDURE DIAGNOSIS: same  PROCEDURE: cystoscopy    HISTORY: Craig Gusman is a 33 year old man with bothersome urinary incontinence, mostly occurring several minutes following voids. Denies stress and urge incontinence. Reports increased frequency. Feels he does not empty completely. Denies history of UTIs.   PVR 0    REVIEW OF OFFICE STUDIES:        DESCRIPTION OF PROCEDURE:  After informed consent was obtained, the patient was brought to the procedure room where he was placed in the supine position with all pressure points well padded.  The penis and scrotum were prepped and draped in a sterile fashion. A flexible cystoscope was introduced through a well-lubricated urethra.  The anterior urethra was free of stricture or diverticulum. The urinary sphincter was intact. Bladder neck was open. Bladder was free of diverticuli, cellules, trabeculation, tumors or stones.The flexible cystoscope was removed and the findings were described to the patient.     ASSESSMENT AND PLAN:  33 year old man with post void incontinence, reports more than just dribbling.   No anatomic abnormality on cystoscopy.  Will plan for referral to pelvic floor PT, as we discussed there was no anatomic reason noted at this time for his incontinence.   He will follow up as needed

## 2024-05-01 ENCOUNTER — VIRTUAL VISIT (OUTPATIENT)
Dept: PSYCHOLOGY | Facility: CLINIC | Age: 34
End: 2024-05-01
Payer: COMMERCIAL

## 2024-05-01 DIAGNOSIS — F32.1 CURRENT MODERATE EPISODE OF MAJOR DEPRESSIVE DISORDER WITHOUT PRIOR EPISODE (H): Primary | ICD-10-CM

## 2024-05-01 PROCEDURE — 90834 PSYTX W PT 45 MINUTES: CPT | Mod: 95 | Performed by: STUDENT IN AN ORGANIZED HEALTH CARE EDUCATION/TRAINING PROGRAM

## 2024-05-01 NOTE — PROGRESS NOTES
"[facility]                                     Progress Note    Patient Name: Craig Gusman  Date: 05/01/2024         Service Type: Individual      Session Start Time: 10.00  Session End Time: 10.45     Session Length: 04    Session #: 38-52    Attendees: Client attended alone    Service Modality:  Video Visit:      Provider verified identity through the following two step process.  Patient provided:  Patient is known previously to provider    Telemedicine Visit: The patient's condition can be safely assessed and treated via synchronous audio and visual telemedicine encounter.      Reason for Telemedicine Visit: Patient has requested telehealth visit    Originating Site (Patient Location): Patient's home    Distant Site (Provider Location): Provider Remote Setting- Home Office    Consent:  The patient/guardian has verbally consented to: the potential risks and benefits of telemedicine (video visit) versus in person care; bill my insurance or make self-payment for services provided; and responsibility for payment of non-covered services.     Patient would like the video invitation sent by:  My Chart    Mode of Communication:  Video Conference via Amwell    Distant Location (Provider):  Off-site    As the provider I attest to compliance with applicable laws and regulations related to telemedicine.    DATA  Interactive Complexity: No  Crisis: No        Progress Since Last Session (Related to Symptoms / Goals / Homework):   Symptoms: No change pt reported no change    Homework: Completed in session review patient's  application of behavior activation strategies and positively reinforced observable changes.      Episode of Care Goals: Minimal progress - ACTION (Actively working towards change); Intervened by reinforcing change plan / affirming steps taken     Current / Ongoing Stressors and Concerns:  The reason for seeking services at this time is: \"Depression, some anxiety\". Patient shared " "that he lost his job during COVID and has been on unemployment for a while and then got a new job with the [employer] and has not been able to start because background check is pending for months now. Patient noted living in uncertainty with \"no motivation to do little basic things\" Patient added that his wife had a miscarriage last October and they have been struggling with having a pregnancy since they got  about 5 years ago.       Current: Today, patient reported an improvement in mood since last session. Patient noted he has been sleeping more but still doesn't have 8 hours of sleep but have been going to bed early. Patient shared that he has been going outside more and playing basket ball with his wife and that has been helpful. Patient noted he is still unpmployed and waiting on background check clearance from employer.     Treatment Objective(s) Addressed in This Session:   Decrease frequency and intensity of feeling down, depressed, hopeless  Patient will increase daily activity level by exercising for 20-30 minutes and participate in at least 1 daily pleasant/fun activities.     Intervention:    CBT: Continue discussion today on behavior activation as an effectively way to manage and mitigate depressive feelings. Discussed the importance of breaking tasks into a set period of time rather than trying to achieve a set amount. Model process in session and discussed how it is easier to set just 5 minutes to clean the yard than aiming at mowing the entire lawn and reading a book for 5 minutes rather than the whole chapter. Couched patient to continue doing things that are achievable at their current level of functioning to mitigate feeling overwhelmed and unmotivated. Couched patient that action is the first step, not motivation.       Assessments completed prior to visit:  The following assessments were completed by patient for this visit:  PHQ9:       5/15/2019     6:50 PM 10/20/2020     2:23 PM " 9/13/2022     3:44 PM 12/13/2023    12:29 PM 3/27/2024    10:34 AM   PHQ-9 SCORE   PHQ-9 Total Score MyChart  13 (Moderate depression) 9 (Mild depression) 13 (Moderate depression) 15 (Moderately severe depression)   PHQ-9 Total Score 12 13 9 13 15     GAD7:       5/15/2019     6:50 PM 10/20/2020     2:23 PM 3/27/2024    10:46 AM   MALVIN-7 SCORE   Total Score  9 (mild anxiety) 12 (moderate anxiety)   Total Score 9 9 12    12          ASSESSMENT: Current Emotional / Mental Status (status of significant symptoms):   Risk status (Self / Other harm or suicidal ideation)   Patient denies current fears or concerns for personal safety.   Patient denies current or recent suicidal ideation or behaviors.   Patient denies current or recent homicidal ideation or behaviors.   Patient denies current or recent self injurious behavior or ideation.   Patient denies other safety concerns.   Patient reports there has been no change in risk factors since their last session.     Patient reports there has been no change in protective factors since their last session.     Recommended that patient call 911 or go to the local ED should there be a change in any of these risk factors.     Appearance:   Appropriate    Eye Contact:   Good    Psychomotor Behavior: Normal    Attitude:   Cooperative  Interested   Orientation:   All   Speech    Rate / Production: Normal/ Responsive    Volume:  Normal    Mood:    Depressed    Affect:    Appropriate    Thought Content:  Clear    Thought Form:  Coherent  Goal Directed    Insight:    Good      Medication Review:   No changes to current psychiatric medication(s)     Medication Compliance:   Yes     Changes in Health Issues:   None reported     Chemical Use Review:   Substance Use: Chemical use reviewed, no active concerns identified      Tobacco Use: No change in amount of tobacco use since last session.  Patient assessed present costs and future losses as a result of smoking    Diagnosis:  296.32 (F33.1)  "Major Depressive Disorder, Recurrent Episode, Moderate _ and With atypical features    Collateral Reports Completed:   Not Applicable    PLAN: (Patient Tasks / Therapist Tasks / Other)    Increase daily physical and achievement  activities and use provided daily log to rate your depression, pleasant feelings, and sense of achievement BEFORE  and AFTER the activity.        Darrel Yoder [facility]                               ----- Service Performed and Documented by ISAIAS------  This note was reviewed and clinical supervision by CLAIR Riley [facility]    5/1/2024     ______________________________________________________________________    Individual Treatment Plan    Patient's Name: Craig Gusman  YOB: 1990    Date of Creation: 04/03/2024  Date Treatment Plan Last Reviewed/Revised:     DSM5 Diagnoses: 296.32 (F33.1) Major Depressive Disorder, Recurrent Episode, Moderate _ and With atypical features  Psychosocial / Contextual Factors: unemployment  PROMIS (reviewed every 90 days):     Referral / Collaboration:  Referral to another professional/service is not indicated at this time..    Anticipated number of session for this episode of care:  15-25  Anticipation frequency of session: Biweekly  Anticipated Duration of each session: 38-52 minutes  Treatment plan will be reviewed in 90 days or when goals have been changed.       MeasurableTreatment Goal(s) related to diagnosis / functional impairment(s)    Goal 1: Patient will identify and address specific triggers to feelings of depression and improve coping by  90 % in the next three months.      I will know I've met my goal when I am no longer in the [place] that I am in right now and less depressed and not putting a lot of mental efforts into basic activities\"     Objective #A (Patient Action)    Patient will increase daily activity level by exercising for 20-30 minutes and participate in at least 1 daily pleasant/fun activities.  Status: Continued - " Date(s): 07/03/2024    Intervention(s)  Therapist will provide psychoeducation, behavioral activation, and cognitive restructuring.    Objective #B  Patient will identify specific areas of cognitive distortion and challenge irrational thoughts with reality   Status: Continued - Date(s): 07/03/2024       Intervention(s)  Therapist will provide psychoeducation, behavioral activation, and cognitive restructuring.    Objective #A (Patient Action)    Patient will maintain a regular sleep and wake pattern daily.  Status: Continued - Date(s): 07/03/2024    Intervention(s)  Therapist will provide psychoeducation, behavioral activation, and cognitive restructuring.    Objective #C  Patient will learn and practice relaxation techniques to manage depression.  Status: Continued - Date(s):  07/03/2024      Intervention(s)  Therapist will provide psychoeducation, behavioral activation, and cognitive restructuring.      Patient has reviewed and agreed to the above plan.      AZIZA Floyd  April 3, 2024    Answers submitted by the patient for this visit:  MALVIN-7 (Submitted on 3/27/2024)  MALVIN 7 TOTAL SCORE: 12

## 2024-05-15 ENCOUNTER — VIRTUAL VISIT (OUTPATIENT)
Dept: PSYCHOLOGY | Facility: CLINIC | Age: 34
End: 2024-05-15
Payer: COMMERCIAL

## 2024-05-15 DIAGNOSIS — F32.0 CURRENT MILD EPISODE OF MAJOR DEPRESSIVE DISORDER, UNSPECIFIED WHETHER RECURRENT (H): Primary | ICD-10-CM

## 2024-05-15 PROCEDURE — 90834 PSYTX W PT 45 MINUTES: CPT | Mod: 95 | Performed by: STUDENT IN AN ORGANIZED HEALTH CARE EDUCATION/TRAINING PROGRAM

## 2024-05-15 NOTE — PROGRESS NOTES
"[facility]                                     Progress Note    Patient Name: Craig Gusman  Date: 05/15/2024         Service Type: Individual      Session Start Time: 11.00  Session End Time: 11.42     Session Length: 38-52    Session #: 05    Attendees: Client attended alone    Service Modality:  Video Visit:      Provider verified identity through the following two step process.  Patient provided:  Patient is known previously to provider    Telemedicine Visit: The patient's condition can be safely assessed and treated via synchronous audio and visual telemedicine encounter.      Reason for Telemedicine Visit: Patient has requested telehealth visit    Originating Site (Patient Location): Patient's home    Distant Site (Provider Location): Provider Remote Setting- Home Office    Consent:  The patient/guardian has verbally consented to: the potential risks and benefits of telemedicine (video visit) versus in person care; bill my insurance or make self-payment for services provided; and responsibility for payment of non-covered services.     Patient would like the video invitation sent by:  My Chart    Mode of Communication:  Video Conference via Amwell    Distant Location (Provider):  Off-site    As the provider I attest to compliance with applicable laws and regulations related to telemedicine.    DATA  Interactive Complexity: No  Crisis: No        Progress Since Last Session (Related to Symptoms / Goals / Homework):   Symptoms: No change pt reported no change    Homework: Completed in session review patient's  application of behavior activation strategies and positively reinforced observable changes.      Episode of Care Goals: Satisfactory progress - ACTION (Actively working towards change); Intervened by reinforcing change plan / affirming steps taken     Current / Ongoing Stressors and Concerns:  The reason for seeking services at this time is: \"Depression, some anxiety\". Patient " "shared that he lost his job during COVID and has been on unemployment for a while and then got a new job with the [employer] and has not been able to start because background check is pending for months now. Patient noted living in uncertainty with \"no motivation to do little basic things\" Patient added that his wife had a miscarriage last October and they have been struggling with having a pregnancy since they got  about 5 years ago.      Current: Today, patient reported an improvement in mood since last session. Patient shared that he got the job he had applied for with the [employer] and is schedule to start work next Monday. Patient reported he is happy to be back to work after about 8 months of unemployment and noted he is also thinking that this job may be the same experience like the previous  where he gets terminated shortly after starting.      Treatment Objective(s) Addressed in This Session:   Decrease frequency and intensity of feeling down, depressed, hopeless  Patient will increase daily activity level by exercising for 20-30 minutes and participate in at least 1 daily pleasant/fun activities.  Patient will identify specific areas of cognitive distortion and challenge irrational thoughts with reality.      Intervention:    CBT: Discussion today about negative automatic thoughts as forms of an internal monologue that can negatively influence how we automatically interpret situations, react, and feel and that changing the content of our thoughts can profoundly shape the way we feel and behave. Connect concept to patient's experience. Work with patient to use mindfulness in observing  their thoughts as internal judgments and subjective evaluations not facts; and to question and challenge them.       Assessments completed prior to visit:  The following assessments were completed by patient for this visit:  PHQ9:       5/15/2019     6:50 PM 10/20/2020     2:23 PM 9/13/2022     3:44 PM " 12/13/2023    12:29 PM 3/27/2024    10:34 AM   PHQ-9 SCORE   PHQ-9 Total Score MyChart  13 (Moderate depression) 9 (Mild depression) 13 (Moderate depression) 15 (Moderately severe depression)   PHQ-9 Total Score 12 13 9 13 15     GAD7:       5/15/2019     6:50 PM 10/20/2020     2:23 PM 3/27/2024    10:46 AM   MALVIN-7 SCORE   Total Score  9 (mild anxiety) 12 (moderate anxiety)   Total Score 9 9 12    12          ASSESSMENT: Current Emotional / Mental Status (status of significant symptoms):   Risk status (Self / Other harm or suicidal ideation)   Patient denies current fears or concerns for personal safety.   Patient denies current or recent suicidal ideation or behaviors.   Patient denies current or recent homicidal ideation or behaviors.   Patient denies current or recent self injurious behavior or ideation.   Patient denies other safety concerns.   Patient reports there has been no change in risk factors since their last session.     Patient reports there has been no change in protective factors since their last session.     Recommended that patient call 911 or go to the local ED should there be a change in any of these risk factors.     Appearance:   Appropriate    Eye Contact:   Good    Psychomotor Behavior: Normal    Attitude:   Cooperative  Interested   Orientation:   All   Speech    Rate / Production: Normal/ Responsive    Volume:  Normal    Mood:    Depressed    Affect:    Appropriate    Thought Content:  Clear    Thought Form:  Coherent  Goal Directed    Insight:    Good      Medication Review:   No changes to current psychiatric medication(s)     Medication Compliance:   Yes     Changes in Health Issues:   None reported     Chemical Use Review:   Substance Use: Chemical use reviewed, no active concerns identified      Tobacco Use: No change in amount of tobacco use since last session.  Patient assessed present costs and future losses as a result of smoking    Diagnosis:  296.32 (F33.1) Major Depressive  "Disorder, Recurrent Episode, Moderate _ and With atypical features    Collateral Reports Completed:   Not Applicable    PLAN: (Patient Tasks / Therapist Tasks / Other)    Increase daily physical and achievement  activities and use provided daily log to rate your depression, pleasant feelings, and sense of achievement BEFORE  and AFTER the activity.        Darrel Yoder ISAIAS  ----- Service Performed and Documented by ISAIAS------  This note was reviewed and clinical supervision by CLAIR Riley [facility]    5/17/2024       ______________________________________________________________________    Individual Treatment Plan    Patient's Name: Craig Gusman  YOB: 1990    Date of Creation: 04/03/2024  Date Treatment Plan Last Reviewed/Revised:     DSM5 Diagnoses: 296.32 (F33.1) Major Depressive Disorder, Recurrent Episode, Moderate _ and With atypical features  Psychosocial / Contextual Factors: unemployment  PROMIS (reviewed every 90 days):     Referral / Collaboration:  Referral to another professional/service is not indicated at this time..    Anticipated number of session for this episode of care:  15-25  Anticipation frequency of session: Biweekly  Anticipated Duration of each session: 38-52 minutes  Treatment plan will be reviewed in 90 days or when goals have been changed.       MeasurableTreatment Goal(s) related to diagnosis / functional impairment(s)    Goal 1: Patient will identify and address specific triggers to feelings of depression and improve coping by  90 % in the next three months.      I will know I've met my goal when I am no longer in the [place] that I am in right now and less depressed and not putting a lot of mental efforts into basic activities\"     Objective #A (Patient Action)    Patient will increase daily activity level by exercising for 20-30 minutes and participate in at least 1 daily pleasant/fun activities.  Status: Continued - Date(s): " 07/03/2024    Intervention(s)  Therapist will provide psychoeducation, behavioral activation, and cognitive restructuring.    Objective #B  Patient will identify specific areas of cognitive distortion and challenge irrational thoughts with reality.   Status: Continued - Date(s): 07/03/2024       Intervention(s)  Therapist will provide psychoeducation, behavioral activation, and cognitive restructuring.    Objective #A (Patient Action)    Patient will maintain a regular sleep and wake pattern daily.  Status: Continued - Date(s): 07/03/2024    Intervention(s)  Therapist will provide psychoeducation, behavioral activation, and cognitive restructuring.    Objective #C  Patient will learn and practice relaxation techniques to manage depression.  Status: Continued - Date(s):  07/03/2024      Intervention(s)  Therapist will provide psychoeducation, behavioral activation, and cognitive restructuring.      Patient has reviewed and agreed to the above plan.      AZIZA Floyd  April 3, 2024    Answers submitted by the patient for this visit:  MALVIN-7 (Submitted on 3/27/2024)  MALVIN 7 TOTAL SCORE: 12

## 2024-06-06 ENCOUNTER — VIRTUAL VISIT (OUTPATIENT)
Dept: PSYCHOLOGY | Facility: CLINIC | Age: 34
End: 2024-06-06
Payer: COMMERCIAL

## 2024-06-06 DIAGNOSIS — F32.0 CURRENT MILD EPISODE OF MAJOR DEPRESSIVE DISORDER, UNSPECIFIED WHETHER RECURRENT (H): Primary | ICD-10-CM

## 2024-06-06 PROCEDURE — 90834 PSYTX W PT 45 MINUTES: CPT | Mod: 95 | Performed by: STUDENT IN AN ORGANIZED HEALTH CARE EDUCATION/TRAINING PROGRAM

## 2024-06-06 NOTE — PROGRESS NOTES
"[facility]                                     Progress Note    Patient Name: Craig Gusman  Date: 06/06/2024         Service Type: Individual      Session Start Time: 11.00  Session End Time: 11.42     Session Length: 38-52    Session #: 06    Attendees: Client attended alone    Service Modality:  Video Visit:      Provider verified identity through the following two step process.  Patient provided:  Patient is known previously to provider    Telemedicine Visit: The patient's condition can be safely assessed and treated via synchronous audio and visual telemedicine encounter.      Reason for Telemedicine Visit: Patient has requested telehealth visit    Originating Site (Patient Location): Patient's home    Distant Site (Provider Location): Provider Remote Setting- Home Office    Consent:  The patient/guardian has verbally consented to: the potential risks and benefits of telemedicine (video visit) versus in person care; bill my insurance or make self-payment for services provided; and responsibility for payment of non-covered services.     Patient would like the video invitation sent by:  My Chart    Mode of Communication:  Video Conference via Amwell    Distant Location (Provider):  Off-site    As the provider I attest to compliance with applicable laws and regulations related to telemedicine.    DATA  Interactive Complexity: No  Crisis: No        Progress Since Last Session (Related to Symptoms / Goals / Homework):   Symptoms: No change pt reported no change    Homework: Completed in session review patient's  application of behavior activation strategies and positively reinforced observable changes.      Episode of Care Goals: Satisfactory progress - ACTION (Actively working towards change); Intervened by reinforcing change plan / affirming steps taken     Current / Ongoing Stressors and Concerns:  The reason for seeking services at this time is: \"Depression, some anxiety\". Patient " "shared that he lost his job during COVID and has been on unemployment for a while and then got a new job with the [employer] and has not been able to start because background check is pending for months now. Patient noted living in uncertainty with \"no motivation to do little basic things\" Patient added that his wife had a miscarriage last October and they have been struggling with having a pregnancy since they got  about 5 years ago.      Current: Today, patient reported an improvement in mood since last session. Patient reported he has started work at his new job and  happy to be back to work after about 8 months of unemployment. He reported the orientation pace has been slow because his boss is on vacation and most of his colleagues are working off site.      Treatment Objective(s) Addressed in This Session:   Decrease frequency and intensity of feeling down, depressed, hopeless  Patient will increase daily activity level by exercising for 20-30 minutes and participate in at least 1 daily pleasant/fun activities.  Patient will identify specific areas of cognitive distortion and challenge irrational thoughts with reality.      Intervention:    CBT: Discussion on the ABC Analysis to identify unhelpful thinking styles and their adverse effect on mood and behavior. Worked through a Thought Diary, to  identify the 'A' - activating event, 'B' - beliefs, thoughts, or expectations that went through your mind at the time of the event, and 'C' - consequences, which are your emotional and behavioral response. Model process in session on ways to uncover unhelpful thinking style by asking questions like  What is bad about this situation?   What am I concluding about myself?' etc. Handout provided.        Assessments completed prior to visit:  The following assessments were completed by patient for this visit:  PHQ9:       5/15/2019     6:50 PM 10/20/2020     2:23 PM 9/13/2022     3:44 PM 12/13/2023    12:29 PM " 3/27/2024    10:34 AM   PHQ-9 SCORE   PHQ-9 Total Score MyChart  13 (Moderate depression) 9 (Mild depression) 13 (Moderate depression) 15 (Moderately severe depression)   PHQ-9 Total Score 12 13 9 13 15     GAD7:       5/15/2019     6:50 PM 10/20/2020     2:23 PM 3/27/2024    10:46 AM   MALVIN-7 SCORE   Total Score  9 (mild anxiety) 12 (moderate anxiety)   Total Score 9 9 12    12        ASSESSMENT: Current Emotional / Mental Status (status of significant symptoms):   Risk status (Self / Other harm or suicidal ideation)   Patient denies current fears or concerns for personal safety.   Patient denies current or recent suicidal ideation or behaviors.   Patient denies current or recent homicidal ideation or behaviors.   Patient denies current or recent self injurious behavior or ideation.   Patient denies other safety concerns.   Patient reports there has been no change in risk factors since their last session.     Patient reports there has been no change in protective factors since their last session.     Recommended that patient call 911 or go to the local ED should there be a change in any of these risk factors.     Appearance:   Appropriate    Eye Contact:   Good    Psychomotor Behavior: Normal    Attitude:   Cooperative  Interested   Orientation:   All   Speech    Rate / Production: Normal/ Responsive    Volume:  Normal    Mood:    Depressed    Affect:    Appropriate    Thought Content:  Clear    Thought Form:  Coherent  Goal Directed    Insight:    Good      Medication Review:   No changes to current psychiatric medication(s)     Medication Compliance:   Yes     Changes in Health Issues:   None reported     Chemical Use Review:   Substance Use: Chemical use reviewed, no active concerns identified      Tobacco Use: No change in amount of tobacco use since last session.  Patient assessed present costs and future losses as a result of smoking    Diagnosis:  296.32 (F33.1) Major Depressive Disorder, Recurrent Episode,  "Moderate _ and With atypical features    Collateral Reports Completed:   Not Applicable    PLAN: (Patient Tasks / Therapist Tasks / Other)    Increase daily physical and achievement  activities and use provided daily log to rate your depression, pleasant feelings, and sense of achievement BEFORE  and AFTER the activity.    Work on provided  \"ABC\" thought diary handout        AZIZA Floyd    ----- Service Performed and Documented by AZIZA------  This note was reviewed and clinical supervision by CLAIR Riley [facility]    6/7/2024       ______________________________________________________________________    Individual Treatment Plan    Patient's Name: Craig Gusman  YOB: 1990    Date of Creation: 04/03/2024  Date Treatment Plan Last Reviewed/Revised:     DSM5 Diagnoses: 296.32 (F33.1) Major Depressive Disorder, Recurrent Episode, Moderate _ and With atypical features  Psychosocial / Contextual Factors: unemployment  PROMIS (reviewed every 90 days):     Referral / Collaboration:  Referral to another professional/service is not indicated at this time..    Anticipated number of session for this episode of care:  15-25  Anticipation frequency of session: Biweekly  Anticipated Duration of each session: 38-52 minutes  Treatment plan will be reviewed in 90 days or when goals have been changed.       MeasurableTreatment Goal(s) related to diagnosis / functional impairment(s)    Goal 1: Patient will identify and address specific triggers to feelings of depression and improve coping by  90 % in the next three months.      I will know I've met my goal when I am no longer in the [place] that I am in right now and less depressed and not putting a lot of mental efforts into basic activities\"     Objective #A (Patient Action)    Patient will increase daily activity level by exercising for 20-30 minutes and participate in at least 1 daily pleasant/fun activities.  Status: Continued - Date(s): " 07/03/2024    Intervention(s)  Therapist will provide psychoeducation, behavioral activation, and cognitive restructuring.    Objective #B  Patient will identify specific areas of cognitive distortion and challenge irrational thoughts with reality.   Status: Continued - Date(s): 07/03/2024       Intervention(s)  Therapist will provide psychoeducation, behavioral activation, and cognitive restructuring.    Objective #A (Patient Action)    Patient will maintain a regular sleep and wake pattern daily.  Status: Continued - Date(s): 07/03/2024    Intervention(s)  Therapist will provide psychoeducation, behavioral activation, and cognitive restructuring.    Objective #C  Patient will learn and practice relaxation techniques to manage depression.  Status: Continued - Date(s):  07/03/2024      Intervention(s)  Therapist will provide psychoeducation, behavioral activation, and cognitive restructuring.      Patient has reviewed and agreed to the above plan.      AZIZA Floyd  April 3, 2024    Answers submitted by the patient for this visit:  MALVIN-7 (Submitted on 3/27/2024)  MALVIN 7 TOTAL SCORE: 12

## 2024-06-26 ENCOUNTER — VIRTUAL VISIT (OUTPATIENT)
Dept: PSYCHOLOGY | Facility: CLINIC | Age: 34
End: 2024-06-26
Payer: COMMERCIAL

## 2024-06-26 DIAGNOSIS — F32.0 CURRENT MILD EPISODE OF MAJOR DEPRESSIVE DISORDER, UNSPECIFIED WHETHER RECURRENT (H): Primary | ICD-10-CM

## 2024-06-26 PROCEDURE — 90832 PSYTX W PT 30 MINUTES: CPT | Mod: 95 | Performed by: STUDENT IN AN ORGANIZED HEALTH CARE EDUCATION/TRAINING PROGRAM

## 2024-07-03 ENCOUNTER — TELEPHONE (OUTPATIENT)
Dept: DERMATOLOGY | Facility: CLINIC | Age: 34
End: 2024-07-03
Payer: COMMERCIAL

## 2024-07-09 ENCOUNTER — PRE VISIT (OUTPATIENT)
Dept: ALLERGY | Facility: CLINIC | Age: 34
End: 2024-07-09

## 2024-07-09 ENCOUNTER — OFFICE VISIT (OUTPATIENT)
Dept: ALLERGY | Facility: CLINIC | Age: 34
End: 2024-07-09
Payer: COMMERCIAL

## 2024-07-09 DIAGNOSIS — Z88.9 ATOPY: ICD-10-CM

## 2024-07-09 DIAGNOSIS — J32.9 CHRONIC RHINOSINUSITIS: Primary | ICD-10-CM

## 2024-07-09 DIAGNOSIS — J30.81 ALLERGY TO DOG DANDER: ICD-10-CM

## 2024-07-09 DIAGNOSIS — R09.82 ALLERGIC RHINITIS WITH POSTNASAL DRIP: ICD-10-CM

## 2024-07-09 DIAGNOSIS — J30.2 SEASONAL ALLERGIC RHINITIS, UNSPECIFIED TRIGGER: ICD-10-CM

## 2024-07-09 DIAGNOSIS — J30.9 ALLERGIC RHINITIS WITH POSTNASAL DRIP: ICD-10-CM

## 2024-07-09 PROCEDURE — 99213 OFFICE O/P EST LOW 20 MIN: CPT | Performed by: DERMATOLOGY

## 2024-07-09 RX ORDER — CETIRIZINE HYDROCHLORIDE 10 MG/1
10 TABLET ORAL DAILY
COMMUNITY

## 2024-07-09 NOTE — TELEPHONE ENCOUNTER
FUTURE VISIT INFORMATION      FUTURE VISIT INFORMATION:  Date: 7.9.24  Time: 12:15  Location: [facility]  REFERRAL INFORMATION:  Referring provider:  [facility]  Referring providers clinic:  [facility]  Reason for visit/diagnosis  Seasonal allergic rhinitis, unspecified trigger [J30.2] per pt recs fv epic  verbal confirmation. MS      RECORDS REQUESTED FROM:       Clinic name Comments Records Status Imaging Status   MIKE 12.13.23  [facility]    9.13.22  [facility]

## 2024-07-09 NOTE — NURSING NOTE
"Chief Complaint   Patient presents with    Allergy Consult     Lifelong spring/fall congestion, some drainage, \"I become a mouth breather for most of the season,\" sometimes a little stuffy in summer, has 2 cats doesn't seem to be an issue, dogs also seem to cause similar symptoms, antihistamines only help somewhat, also has tried flonase in the past, only seemed to help somewhat. Last antihistamine this AM.       Sher Torres RN    "

## 2024-07-09 NOTE — LETTER
"7/9/2024      Craig Gusman  [address] [place] [address]      Dear Colleague,    Thank you for referring your patient, Craig Gusman, to the [facility] [place]. Please see a copy of my visit note below.    [facility] Dermato-allergology Note  Office visit  Encounter Date: Jul 9, 2024  ____________________________________________    CC: Allergy Consult (Lifelong spring/fall congestion, some drainage, \"I become a mouth breather for most of the season,\" sometimes a little stuffy in summer, has 2 cats doesn't seem to be an issue, dogs also seem to cause similar symptoms, antihistamines only help somewhat, also has tried flonase in the past, only seemed to help somewhat. Last antihistamine this AM.  )      HPI:  (Jul 9, 2024)  Mr. Craig Gusman is a(n) 34 year old male who presents today as new patient for allergy consultation  - Referred by Dr. Darwin Park () for seasonal allergic rhinitis with unspecified trigger  - Provider referral comment: figuring out what to avoid   - Typically has stuffy, runny nose, red eyes, sinus pressure and drainage, PND (coughs to clear but otherwise does not cough) in spring (entire season) and fall (September & October)  - Sometimes wakes up with stuffy/runny nose in summer and winter  - Has 2 cats and no issues, but does have stuffy/runny nose around dogs  - Last antihistamine (cetirizine) taken this AM; takes daily perennially  - Symptoms are less severe with cetirizine but still break through  - Flonase used in the past with only some relief  - Otherwise feeling well in usual state of health    Physical Exam:  General: In no acute distress, well-developed, well-nourished  Eyes: no conjunctivitis  ENT: no signs of rhinitis   Pulmonary: no wheezing or coughing  Skin: no active eczematous skin lesions on visible skin    Past Medical History:   Patient Active Problem List   Diagnosis     Chews tobacco     Past Medical " History:   Diagnosis Date     Major depressive disorder, recurrent episode, in partial remission (H24)      Allergies:  No Known Allergies    Medications:  Current Outpatient Medications   Medication Sig Dispense Refill     amphetamine-dextroamphetamine (ADDERALL XR) 15 MG 24 hr capsule Take 25 mg by mouth daily       buPROPion (WELLBUTRIN XL) 150 MG 24 hr tablet        cetirizine (ZYRTEC) 10 MG tablet Take 10 mg by mouth daily       clindamycin (CLEOCIN T) 1 % external lotion Apply topically 2 times daily (Patient not taking: Reported on 7/9/2024) 60 mL 4     loratadine (CLARITIN) 10 MG tablet Take 10 mg by mouth daily as needed for allergies (Patient not taking: Reported on 7/9/2024)       Current Facility-Administered Medications   Medication Dose Route Frequency Provider Last Rate Last Admin     lidocaine (XYLOCAINE) 2 % external gel   Urethral Once          Social History:  The patient works as a . Patient has the following hobbies or non-occupational exposure: N/A.    Family History:  Family History   Problem Relation Age of Onset     Hyperlipidemia Father      Crohn's Disease Sister      Coronary Artery Disease Early Onset Maternal Grandfather      No Known Problems Paternal Grandmother      Lung Cancer Paternal Grandfather         non-smoking   Positive for seasonal allergies, but he has the most acute seasonal allergy symptoms.    Previous Labs, Allergy Tests, Dermatopathology, Imaging:  [Upon review of [facility] chart, no prior allergist records as of 7/9/24.]    3/15/24 Dr. Alexis Montaño (aud)  FROM [facility]:  The patient reports longstanding difficulty hearing in background noise. He feels hearing is symmetrical between the ears. He also reports history of swimmers ear primarily on the right (last infection was 15 years ago), and PE tubes as a child. Occasional bilateral tinnitus. Reports family history of hearing loss in his mother- unsure age of onset. 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 He denies aural pain, pressure, drainage, dizziness.     FROM [facility]:  Normal hearing bilaterally.   Patient was counseled on good communication strategies, hearing loss and impact on communication.  Handout on good communication strategies was given to patient. It is recommended that the patient return to monitor hearing if changes are noted or new ear symptoms arise     2/27/24 ROSA ISELA Lopez (derm)  FROM [facility]:  Concern 5:  - Acne  - Longstanding  - Patient has difficulty avoiding picking at lesions    FROM A&P:  # Excoriated acne  - The patient expresses awareness of the picking and an effort to stop.   - Start clindamycin 1% lotion twice daily     12/13/23 Dr. Darwin Park ([facility])  FROM [facility]:  Seasonal allergic rhinitis, unspecified trigger  Adult Allergy/Asthma  Referral    Hearing disorder, unspecified laterality   Adult Audiology  Referral    9/13/22 Dr. Anurag Valle ([facility])  FROM [facility]:  Would like a referral for allergies and a referral to an audiologist    FROM A&P:  Seasonal allergic rhinitis, unspecified trigger  Comment: worse , so see  Plan: Adult Allergy/Asthma Referral, Adult ENT  Referral    Decreased hearing, unspecified laterality  Comment: see audiology  Plan: Adult ENT  Referral    [Note in Referrals tab in Epic that referrals were faxed to [facility] and [facility].]    Referred By: Darwin Park DO ([facility])  [address] [address]     Allergy Tests:  Past Allergy Test    Order for Future Allergy Testing:    [x] Outpatient  [] Inpatient: [facility]..../ Bed ....       Skin Atopy (atopic dermatitis) [x] Yes   [] No .........  Contact allergies:   [] Yes   [x] No ..........  Hand eczema:   [] Yes   [x] No           Leading hand:   [] R   [] L       [] Ambidextrous         Drug allergies:        [] Yes   [x] No  which?......    Urticaria/Angioedema  [] Yes   [x] No .........  Food Allergy:  [] Yes   [x] No   which?......  Pets :  [x] Yes   [] No  which?......         [x]  Rhinitis   [x] Conjunctivitis   [x] Sinusitis   [] Polyposis   [] Otitis   [] Pharyngitis         [x]  Postnasal drip    []  none  Operations:   [] Tonsils   [] Septum   [] Sinus   [] Polyposis        [] Asthma bronchiale   [x] Coughing      []  none  Symptoms (mostly rhinoconjunctivitis and asthma) aggravated by:  Season   [] I   [] II   [x] III   [x] IV   [x]V   []VI   []VII   []VIII   [x]IX   [x]X   []XI   []XII     [x] perennial   Day time      [] morning   [] noon      [] evening        [] night    [] whole day........  [x]  none  Location/changes    [] inside        [] outside   [] mountains    [] sea     [] others.............   [x]  none  Triggers, specific     [x] animals     [] plants     [] dust              [] others ...........................    []  none  Triggers, others       [] work          [] psyche    [] sport            [] others .............................  [x]  none  Irritant                [] phys efforts [] smoke    [] heat/cold     [] odors  []others............... [x]  none    Order for PATCH TESTS  Reason for tests (suspected allergy): not necessary at this time  Known previous allergies: none  Standardized panels  [] Standard panel (40 tests)  [] Preservatives & Antimicrobials (31 tests)  [] Emulsifiers & Additives (25 tests)   [] Perfumes/Flavours & Plants (25 tests)  [] Hairdresser panel (12 tests)  [] Rubber Chemicals (22 tests)  [] Plastics (26 tests)  [] Colorants/Dyes/Food additives (20 tests)  [] Metals (implants/dental) (24 tests)  [] Local anaesthetics/NSAIDs (13 tests)  [] Antibiotics & Antimycotics (14 tests)   [] Corticosteroids (15 tests)   [] Photopatch test (62 tests)   [] Others:       [] Patient's Own Products:   DO NOT test if chemical or biological identity is unknown!   always ask from patient the product information and safety sheets (MSDS)       Order for PRICK TESTS    Reason for tests (suspected  allergy): perennial rhinitis; seasonal aggravation in spring and fall with RC, RS, PND; clinical sensitization to dog epithelium (not to cats at home)  Known previous allergies: none    Standardized prick panels  [x] Atopic panel (20 tests)  [] Pediatric Panel (12 tests)  [] Milk, Meat, Eggs, Grains (20 tests)   [] Dust, Epithelia, Feathers (10 tests)  [] Fish, Seafood, Shellfish (17 tests)  [] Nuts, Beans (14 tests)  [] Spice, Vegetable, Fruit (17 tests)  [] Pollen Panel = Tree, Grass, Weed (24 tests)  [] Others:       [] Patient's Own Products:   DO NOT test if chemical or biological identity is unknown!   always ask from patient the product information and safety sheets (MSDS)     Standardized intradermal tests    [] Alternaria alternata  [] Aspergillus fumigatus  [] Cladosporium herbarum   [] Penicillium notatum  [] Dermatophagoides farinae  [] Dermatophagoides pteronyssinus  [] Dog Epithelium  [] Cat Epithelium  [] Others:     [] Bee venom   [] Wasp venom  !!Specific protocol with dilutions!!       Order for Drug allergy tests (prick & intradermal & patch tests)    [] Penicillin G  [] Ampicillin [] Cefazolin   [] Ceftriaxone   [] Ceftazidime  [] Bactrim    [] Others:   Order for ... as test date    Atopy Screen (Placed Jul 9, 2024)  7/9/24: Patient took a cetirizine this AM and positive control was negative. Will perform at a future appointment after patient has held all antihistamines x 5-7 days.  No Substance Readings (15 min) Evaluation   POS Histamine 1mg/ml -    NEG NaCl 0.9% -      No Substance Readings (15 min) Evaluation   1 Alternaria alternata (tenuis)  -    2 Cladosporium herbarum -    3 Aspergillus fumigatus -    4 Penicillium notatum -    5 Dermatophagoides pteronyssinus -    6 Dermatophagoides farinae -    7 Dog epithelium (canis spp) -    8 Cat hair (elizabeth catus) -    9 Cockroach   (Blatella americana & germanica) -    10 Grass mix midwest   (Sarai, Orchard, Redtop, Rosendo) -    11 Otilio grass  (sorghum halepense) -    12 Weed mix   (common Cocklebur, Lamb s quarters, rough redroot Pigweed, Dock/Sorrel) -    13 Mug wort (artemisia vulgare) -    14 Ragweed giant/short (ambrosia spp) -    15 White birch (Betula papyrifera) -    16 Tree mix 1 (Pecan, Maple BHR, Oak RVW, american [place], black [place]) -    17 Red cedar (juniperus virginia) -    18 Tree mix 2   (white Saw, river/red Birch, black [place], common Citrus, american Elm) -    19 Box elder/Maple mix (acer spp) -    20 Hettinger shagbark (carya ovata) -    Conclusion:      Assessment & Plan:    ==> Final Diagnosis:     # Atopic predisposition with:  Perennial rhinitis  Seasonal aggravation in spring and fall with RC, RS, PND  Clinical sensitization to dog epithelium (not to cats at home)  * chronic illness with exacerbation, progression, side effects from treatment    These conclusions are made at the best of one's knowledge and belief based on the provided evidence such as patient's history and allergy test results and they can change over time or can be incomplete because of missing information.    ==> Treatment Plan:    >> Will discuss updating treatment plan once allergy testing is completed.    Procedures Performed: None    Staff Involved: Provider, Staff, and Scribe    Scribe Disclosure:   I, DARIAN YUSUF, am serving as a scribe to document services personally performed by Ethan Brunson MD based on data collection and the provider's statements to me.     Staff Physician Comments:  I was present with the scribe who participated in the documentation of the note. I have verified the history and personally performed the physical exam and medical decision making. I agree with the assessment and plan as documented in the note. I have reviewed and if necessary amended the note.      Ethan Brunson MD  Professor  Head of Dermato-Allergy Division  Department of Dermatology  [facility]      Follow-up in Derm-Allergy  clinic for allergy tests as planned  - next scheduled to see referring provider (PCP) on 12/13/24    I spent a total of 23 minutes with Craig Gusman. This time was spent counseling the patient and/or coordinating care, explaining the allergy tests      Again, thank you for allowing me to participate in the care of your patient.        Sincerely,        Ethan Brunson MD

## 2024-07-09 NOTE — PROGRESS NOTES
"[facility] Dermato-allergology Note  Office visit  Encounter Date: Jul 9, 2024  ____________________________________________    CC: Allergy Consult (Lifelong spring/fall congestion, some drainage, \"I become a mouth breather for most of the season,\" sometimes a little stuffy in summer, has 2 cats doesn't seem to be an issue, dogs also seem to cause similar symptoms, antihistamines only help somewhat, also has tried flonase in the past, only seemed to help somewhat. Last antihistamine this AM.  )      HPI:  (Jul 9, 2024)  Mr. Craig Gusman is a(n) 34 year old male who presents today as new patient for allergy consultation  - Referred by Dr. Darwin Park () for seasonal allergic rhinitis with unspecified trigger  - Provider referral comment: figuring out what to avoid   - Typically has stuffy, runny nose, red eyes, sinus pressure and drainage, PND (coughs to clear but otherwise does not cough) in spring (entire season) and fall (September & October)  - Sometimes wakes up with stuffy/runny nose in summer and winter  - Has 2 cats and no issues, but does have stuffy/runny nose around dogs  - Last antihistamine (cetirizine) taken this AM; takes daily perennially  - Symptoms are less severe with cetirizine but still break through  - Flonase used in the past with only some relief  - Otherwise feeling well in usual state of health    Physical Exam:  General: In no acute distress, well-developed, well-nourished  Eyes: no conjunctivitis  ENT: no signs of rhinitis   Pulmonary: no wheezing or coughing  Skin: no active eczematous skin lesions on visible skin    Past Medical History:   Patient Active Problem List   Diagnosis    Chews tobacco     Past Medical History:   Diagnosis Date    Major depressive disorder, recurrent episode, in partial remission (H24)      Allergies:  No Known Allergies    Medications:  Current Outpatient Medications   Medication Sig Dispense Refill    amphetamine-dextroamphetamine " (ADDERALL XR) 15 MG 24 hr capsule Take 25 mg by mouth daily      buPROPion (WELLBUTRIN XL) 150 MG 24 hr tablet       cetirizine (ZYRTEC) 10 MG tablet Take 10 mg by mouth daily      clindamycin (CLEOCIN T) 1 % external lotion Apply topically 2 times daily (Patient not taking: Reported on 7/9/2024) 60 mL 4    loratadine (CLARITIN) 10 MG tablet Take 10 mg by mouth daily as needed for allergies (Patient not taking: Reported on 7/9/2024)       Current Facility-Administered Medications   Medication Dose Route Frequency Provider Last Rate Last Admin    lidocaine (XYLOCAINE) 2 % external gel   Urethral Once          Social History:  The patient works as a . Patient has the following hobbies or non-occupational exposure: N/A.    Family History:  Family History   Problem Relation Age of Onset    Hyperlipidemia Father     Crohn's Disease Sister     Coronary Artery Disease Early Onset Maternal Grandfather     No Known Problems Paternal Grandmother     Lung Cancer Paternal Grandfather         non-smoking   Positive for seasonal allergies, but he has the most acute seasonal allergy symptoms.    Previous Labs, Allergy Tests, Dermatopathology, Imaging:  [Upon review of [facility] chart, no prior allergist records as of 7/9/24.]    3/15/24 Dr. Alexis Montaño (aud)  FROM [facility] [place]:  The patient reports longstanding difficulty hearing in background noise. He feels hearing is symmetrical between the ears. He also reports history of swimmers ear primarily on the right (last infection was 15 years ago), and PE tubes as a child. Occasional bilateral tinnitus. Reports family history of hearing loss in his mother- unsure age of onset. 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 He denies aural pain, pressure, drainage, dizziness.     FROM A&P:  Normal hearing bilaterally.   Patient was counseled on good communication strategies, hearing loss and impact on communication.  Handout on good communication strategies was  given to patient. It is recommended that the patient return to monitor hearing if changes are noted or new ear symptoms arise     2/27/24 ROSA ISELA Lopez (derm)  FROM [facility] [place] [facility]":  Concern 5:  - Acne  - Longstanding  - Patient has difficulty avoiding picking at lesions    FROM A&P:  # Excoriated acne  - The patient expresses awareness of the picking and an effort to stop.   - Start clindamycin 1% lotion twice daily     12/13/23 Dr. Darwin Park (FP)  FROM A&P:  Seasonal allergic rhinitis, unspecified trigger  Adult Allergy/Asthma  Referral    Hearing disorder, unspecified laterality   Adult Audiology  Referral    9/13/22 Dr. Anurag Valle (FP)  FROM \[facility] [place] [facility]\"":  Would like a referral for allergies and a referral to an audiologist    FROM A&P:  Seasonal allergic rhinitis, unspecified trigger  Comment: worse , so see  Plan: Adult Allergy/Asthma Referral, Adult ENT  Referral    Decreased hearing, unspecified laterality  Comment: see audiology  Plan: Adult ENT  Referral    [Note in Referrals tab in Epic that referrals were faxed to [facility] and [facility].]    Referred By: Darwin Park DO (FP)  [address] [address]     Allergy Tests:  Past Allergy Test    Order for Future Allergy Testing:    [x] Outpatient  [] Inpatient: [facility]..../ Bed ....       Skin Atopy (atopic dermatitis) [x] Yes   [] No .........  Contact allergies:   [] Yes   [x] No ..........  Hand eczema:   [] Yes   [x] No           Leading hand:   [] R   [] L       [] Ambidextrous         Drug allergies:        [] Yes   [x] No  which?......    Urticaria/Angioedema  [] Yes   [x] No .........  Food Allergy:  [] Yes   [x] No  which?......  Pets :  [x] Yes   [] No  which?......         [x]  Rhinitis   [x] Conjunctivitis   [x] Sinusitis   [] Polyposis   [] Otitis   [] Pharyngitis         [x]  Postnasal drip    []  none  Operations:   [] Tonsils   [] Septum   [] Sinus   [] Polyposis        [] Asthma  bronchiale   [x] Coughing      []  none  Symptoms (mostly rhinoconjunctivitis and asthma) aggravated by:  Season   [] I   [] II   [x] III   [x] IV   [x]V   []VI   []VII   []VIII   [x]IX   [x]X   []XI   []XII     [x] perennial   Day time      [] morning   [] noon      [] evening        [] night    [] whole day........  [x]  none  Location/changes    [] inside        [] outside   [] mountains    [] sea     [] others.............   [x]  none  Triggers, specific     [x] animals     [] plants     [] dust              [] others ...........................    []  none  Triggers, others       [] work          [] psyche    [] sport            [] others .............................  [x]  none  Irritant                [] phys efforts [] smoke    [] heat/cold     [] odors  []others............... [x]  none    Order for PATCH TESTS  Reason for tests (suspected allergy): not necessary at this time  Known previous allergies: none  Standardized panels  [] Standard panel (40 tests)  [] Preservatives & Antimicrobials (31 tests)  [] Emulsifiers & Additives (25 tests)   [] Perfumes/Flavours & Plants (25 tests)  [] Hairdresser panel (12 tests)  [] Rubber Chemicals (22 tests)  [] Plastics (26 tests)  [] Colorants/Dyes/Food additives (20 tests)  [] Metals (implants/dental) (24 tests)  [] Local anaesthetics/NSAIDs (13 tests)  [] Antibiotics & Antimycotics (14 tests)   [] Corticosteroids (15 tests)   [] Photopatch test (62 tests)   [] Others:       [] Patient's Own Products:   DO NOT test if chemical or biological identity is unknown!   always ask from patient the product information and safety sheets (MSDS)       Order for PRICK TESTS    Reason for tests (suspected allergy): perennial rhinitis; seasonal aggravation in spring and fall with RC, RS, PND; clinical sensitization to dog epithelium (not to cats at home)  Known previous allergies: none    Standardized prick panels  [x] Atopic panel (20 tests)  [] Pediatric Panel (12 tests)  []  Milk, Meat, Eggs, Grains (20 tests)   [] Dust, Epithelia, Feathers (10 tests)  [] Fish, Seafood, Shellfish (17 tests)  [] Nuts, Beans (14 tests)  [] Spice, Vegetable, Fruit (17 tests)  [] Pollen Panel = Tree, Grass, Weed (24 tests)  [] Others:       [] Patient's Own Products:   DO NOT test if chemical or biological identity is unknown!   always ask from patient the product information and safety sheets (MSDS)     Standardized intradermal tests    [] Alternaria alternata  [] Aspergillus fumigatus  [] Cladosporium herbarum   [] Penicillium notatum  [] Dermatophagoides farinae  [] Dermatophagoides pteronyssinus  [] Dog Epithelium  [] Cat Epithelium  [] Others:     [] Bee venom   [] Wasp venom  !!Specific protocol with dilutions!!       Order for Drug allergy tests (prick & intradermal & patch tests)    [] Penicillin G  [] Ampicillin [] Cefazolin   [] Ceftriaxone   [] Ceftazidime  [] Bactrim    [] Others:   Order for ... as test date    Atopy Screen (Placed Jul 9, 2024)  7/9/24: Patient took a cetirizine this AM and positive control was negative. Will perform at a future appointment after patient has held all antihistamines x 5-7 days.  No Substance Readings (15 min) Evaluation   POS Histamine 1mg/ml -    NEG NaCl 0.9% -      No Substance Readings (15 min) Evaluation   1 Alternaria alternata (tenuis)  -    2 Cladosporium herbarum -    3 Aspergillus fumigatus -    4 Penicillium notatum -    5 Dermatophagoides pteronyssinus -    6 Dermatophagoides farinae -    7 Dog epithelium (canis spp) -    8 Cat hair (elizabeth catus) -    9 Cockroach   (Blatella americana & germanica) -    10 Grass mix midwest   (Sarai, Orchard, Redtop, Rosendo) -    11 Otilio grass (sorghum halepense) -    12 Weed mix   (common Cocklebur, Lamb s quarters, rough redroot Pigweed, Dock/Sorrel) -    13 Mug wort (artemisia vulgare) -    14 Ragweed giant/short (ambrosia spp) -    15 White birch (Betula papyrifera) -    16 Tree mix 1 (Pecan, Maple BHR, Grant  RVW, american [place], black [place]) -    17 Red cedar (juniperus virginia) -    18 Tree mix 2   (white Saw, river/red Birch, black [place], common [place], american Elm) -    19 Box elder/Maple mix (acer spp) -    20 [place] shagbark (carya ovata) -    Conclusion:      Assessment & Plan:    ==> Final Diagnosis:     # Atopic predisposition with:  Perennial rhinitis  Seasonal aggravation in spring and fall with RC, RS, PND  Clinical sensitization to dog epithelium (not to cats at home)  * chronic illness with exacerbation, progression, side effects from treatment    These conclusions are made at the best of one's knowledge and belief based on the provided evidence such as patient's history and allergy test results and they can change over time or can be incomplete because of missing information.    ==> Treatment Plan:    >> Will discuss updating treatment plan once allergy testing is completed.    Procedures Performed: None    Staff Involved: Provider, Staff, and Scribe    Scribe Disclosure:   I, DARIAN YUSUF, am serving as a scribe to document services personally performed by Ethan Brunson MD based on data collection and the provider's statements to me.     Staff Physician Comments:  I was present with the scribe who participated in the documentation of the note. I have verified the history and personally performed the physical exam and medical decision making. I agree with the assessment and plan as documented in the note. I have reviewed and if necessary amended the note.      Ethan Brunson MD  Professor  Head of Dermato-Allergy Division  Department of Dermatology  [facility]      Follow-up in Derm-Allergy clinic for allergy tests as planned  - next scheduled to see referring provider (PCP) on 12/13/24    I spent a total of 23 minutes with Craig Naseem. This time was spent counseling the patient and/or coordinating care, explaining the allergy tests

## 2024-08-06 ENCOUNTER — OFFICE VISIT (OUTPATIENT)
Dept: ALLERGY | Facility: CLINIC | Age: 34
End: 2024-08-06
Payer: COMMERCIAL

## 2024-08-06 DIAGNOSIS — J30.2 SEASONAL ALLERGIC RHINITIS, UNSPECIFIED TRIGGER: ICD-10-CM

## 2024-08-06 DIAGNOSIS — J30.81 PET ALLERGY: ICD-10-CM

## 2024-08-06 DIAGNOSIS — Z88.9 ATOPY: ICD-10-CM

## 2024-08-06 DIAGNOSIS — Z91.09 HOUSE DUST MITE ALLERGY: Primary | ICD-10-CM

## 2024-08-06 DIAGNOSIS — J32.9 CHRONIC RHINOSINUSITIS: ICD-10-CM

## 2024-08-06 DIAGNOSIS — R09.82 ALLERGIC RHINITIS WITH POSTNASAL DRIP: ICD-10-CM

## 2024-08-06 DIAGNOSIS — J30.9 ALLERGIC RHINITIS WITH POSTNASAL DRIP: ICD-10-CM

## 2024-08-06 PROCEDURE — 95004 PERQ TESTS W/ALRGNC XTRCS: CPT | Mod: GC | Performed by: DERMATOLOGY

## 2024-08-06 PROCEDURE — 99214 OFFICE O/P EST MOD 30 MIN: CPT | Mod: 25 | Performed by: DERMATOLOGY

## 2024-08-06 RX ORDER — FEXOFENADINE HCL 180 MG/1
180 TABLET ORAL AT BEDTIME
Qty: 30 TABLET | Refills: 3 | Status: SHIPPED | OUTPATIENT
Start: 2024-08-06

## 2024-08-06 RX ORDER — MOMETASONE FUROATE MONOHYDRATE 50 UG/1
2 SPRAY, METERED NASAL AT BEDTIME
Qty: 17 G | Refills: 3 | Status: SHIPPED | OUTPATIENT
Start: 2024-08-06

## 2024-08-06 NOTE — NURSING NOTE
Chief Complaint   Patient presents with    Allergy Testing Followup     Atopy, off antihistamines      Sher Torres RN

## 2024-08-06 NOTE — LETTER
8/6/2024      Craig Gusman  [address] [place] [address]      Dear Colleague,    Thank you for referring your patient, Craig Gusman, to the [facility] [place]. Please see a copy of my visit note below.    [facility] Dermato-allergology Note  Office visit  Encounter Date: Aug 6, 2024  ____________________________________________    CC: Allergy Testing Followup (Atopy, off antihistamines )      HPI:  (Aug 6, 2024)  Mr. Craig Gusman is a(n) 34 year old male who presents today as a return patient for allergy tests as planned  - Follow-up in Derm-Allergy clinic for allergy tests as planned  - Confirmed patient has been off of antihistamines in preparation for this appointment  - Otherwise feeling well in usual state of health    Physical Exam:  General: In no acute distress, well-developed, well-nourished  Eyes: no conjunctivitis  ENT: no signs of rhinitis   Pulmonary: no wheezing or coughing  Skin: Focused examination of the skin on test sites was performed = see test results below  No active eczematous skin lesions on tests sites, particularly back    Earlier History and Allergy Exams:  (Jul 9, 2024)  New patient for allergy consultation  - Referred by Dr. Darwin Park () for seasonal allergic rhinitis with unspecified trigger  - Provider referral comment: figuring out what to avoid   - Typically has stuffy, runny nose, red eyes, sinus pressure and drainage, PND (coughs to clear but otherwise does not cough) in spring (entire season) and fall (September & October)  - Sometimes wakes up with stuffy/runny nose in summer and winter  - Has 2 cats and no issues, but does have stuffy/runny nose around dogs  - Last antihistamine (cetirizine) taken this AM; takes daily perennially  - Symptoms are less severe with cetirizine but still break through  - Flonase used in the past with only some relief    Past Medical History:   Patient Active Problem List    Diagnosis     Chews tobacco     Past Medical History:   Diagnosis Date     Major depressive disorder, recurrent episode, in partial remission (H24)      Allergies:  No Known Allergies    Medications:  Current Outpatient Medications   Medication Sig Dispense Refill     fexofenadine (ALLEGRA) 180 MG tablet Take 1 tablet (180 mg) by mouth at bedtime 30 tablet 3     mometasone (NASONEX) 50 MCG/ACT nasal spray Spray 2 sprays into both nostrils at bedtime 17 g 3     amphetamine-dextroamphetamine (ADDERALL XR) 15 MG 24 hr capsule Take 25 mg by mouth daily       buPROPion (WELLBUTRIN XL) 150 MG 24 hr tablet        cetirizine (ZYRTEC) 10 MG tablet Take 10 mg by mouth daily       clindamycin (CLEOCIN T) 1 % external lotion Apply topically 2 times daily (Patient not taking: Reported on 7/9/2024) 60 mL 4     loratadine (CLARITIN) 10 MG tablet Take 10 mg by mouth daily as needed for allergies (Patient not taking: Reported on 7/9/2024)       Current Facility-Administered Medications   Medication Dose Route Frequency Provider Last Rate Last Admin     lidocaine (XYLOCAINE) 2 % external gel   Urethral Once          Social History:  The patient works as a . Patient has the following hobbies or non-occupational exposure: N/A.    Family History:  Family History   Problem Relation Age of Onset     Hyperlipidemia Father      Crohn's Disease Sister      Coronary Artery Disease Early Onset Maternal Grandfather      No Known Problems Paternal Grandmother      Lung Cancer Paternal Grandfather         non-smoking   Positive for seasonal allergies, but he has the most acute seasonal allergy symptoms.    Previous Labs, Allergy Tests, Dermatopathology, Imaging:  [Upon review of [facility] chart, no prior allergist records as of 7/9/24.]    3/15/24 Dr. Alexis Montaño (aud)  FROM [facility]:  The patient reports longstanding difficulty hearing in background noise. He feels hearing is symmetrical between the ears. He also reports history of  swimmers ear primarily on the right (last infection was 15 years ago), and PE tubes as a child. Occasional bilateral tinnitus. Reports family history of hearing loss in his mother- unsure age of onset. 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 He denies aural pain, pressure, drainage, dizziness.     FROM A&P:  Normal hearing bilaterally.   Patient was counseled on good communication strategies, hearing loss and impact on communication.  Handout on good communication strategies was given to patient. It is recommended that the patient return to monitor hearing if changes are noted or new ear symptoms arise     2/27/24 ROSA ISELA Lopez (derm)  FROM [facility]:  Concern 5:  - Acne  - Longstanding  - Patient has difficulty avoiding picking at lesions    FROM A&P:  # Excoriated acne  - The patient expresses awareness of the picking and an effort to stop.   - Start clindamycin 1% lotion twice daily     12/13/23 Dr. Darwin Park (FP)  FROM A&P:  Seasonal allergic rhinitis, unspecified trigger  Adult Allergy/Asthma  Referral    Hearing disorder, unspecified laterality   Adult Audiology  Referral    9/13/22 Dr. Anurag Valle (FP)  FROM [facility]:  Would like a referral for allergies and a referral to an audiologist    FROM A&P:  Seasonal allergic rhinitis, unspecified trigger  Comment: worse , so see  Plan: Adult Allergy/Asthma Referral, Adult ENT  Referral    Decreased hearing, unspecified laterality  Comment: see audiology  Plan: Adult ENT  Referral    [Note in Referrals tab in Epic that referrals were faxed to [facility] and [facility].]    Referred By: Darwin Park DO (FP)  [address] [address]     Allergy Tests:  Past Allergy Test    Order for Future Allergy Testing:    [x] Outpatient  [] Inpatient: [facility]..../ Bed ....       Skin Atopy (atopic dermatitis) [x] Yes   [] No .........  Contact allergies:   [] Yes   [x] No ..........  Hand  eczema:   [] Yes   [x] No           Leading hand:   [] R   [] L       [] Ambidextrous         Drug allergies:        [] Yes   [x] No  which?......    Urticaria/Angioedema  [] Yes   [x] No .........  Food Allergy:  [] Yes   [x] No  which?......  Pets :  [x] Yes   [] No  which?......         [x]  Rhinitis   [x] Conjunctivitis   [x] Sinusitis   [] Polyposis   [] Otitis   [] Pharyngitis         [x]  Postnasal drip    []  none  Operations:   [] Tonsils   [] Septum   [] Sinus   [] Polyposis        [] Asthma bronchiale   [x] Coughing      []  none  Symptoms (mostly rhinoconjunctivitis and asthma) aggravated by:  Season   [] I   [] II   [x] III   [x] IV   [x]V   []VI   []VII   []VIII   [x]IX   [x]X   []XI   []XII     [x] perennial   Day time      [] morning   [] noon      [] evening        [] night    [] whole day........  [x]  none  Location/changes    [] inside        [] outside   [] mountains    [] sea     [] others.............   [x]  none  Triggers, specific     [x] animals     [] plants     [] dust              [] others ...........................    []  none  Triggers, others       [] work          [] psyche    [] sport            [] others .............................  [x]  none  Irritant                [] phys efforts [] smoke    [] heat/cold     [] odors  []others............... [x]  none    Order for PATCH TESTS  Reason for tests (suspected allergy): not necessary at this time  Known previous allergies: none  Standardized panels  [] Standard panel (40 tests)  [] Preservatives & Antimicrobials (31 tests)  [] Emulsifiers & Additives (25 tests)   [] Perfumes/Flavours & Plants (25 tests)  [] Hairdresser panel (12 tests)  [] Rubber Chemicals (22 tests)  [] Plastics (26 tests)  [] Colorants/Dyes/Food additives (20 tests)  [] Metals (implants/dental) (24 tests)  [] Local anaesthetics/NSAIDs (13 tests)  [] Antibiotics & Antimycotics (14 tests)   [] Corticosteroids (15 tests)   [] Photopatch test (62 tests)   [] Others:        [] Patient's Own Products:   DO NOT test if chemical or biological identity is unknown!   always ask from patient the product information and safety sheets (MSDS)       Order for PRICK TESTS    Reason for tests (suspected allergy): perennial rhinitis; seasonal aggravation in spring and fall with RC, RS, PND; clinical sensitization to dog epithelium (not to cats at home)  Known previous allergies: none    Standardized prick panels  [x] Atopic panel (20 tests)  [] Pediatric Panel (12 tests)  [] Milk, Meat, Eggs, Grains (20 tests)   [] Dust, Epithelia, Feathers (10 tests)  [] Fish, Seafood, Shellfish (17 tests)  [] Nuts, Beans (14 tests)  [] Spice, Vegetable, Fruit (17 tests)  [] Pollen Panel = Tree, Grass, Weed (24 tests)  [] Others:       [] Patient's Own Products:   DO NOT test if chemical or biological identity is unknown!   always ask from patient the product information and safety sheets (MSDS)     Standardized intradermal tests    [] Alternaria alternata  [] Aspergillus fumigatus  [] Cladosporium herbarum   [] Penicillium notatum  [] Dermatophagoides farinae  [] Dermatophagoides pteronyssinus  [] Dog Epithelium  [] Cat Epithelium  [] Others:     [] Bee venom   [] Wasp venom  !!Specific protocol with dilutions!!       Order for Drug allergy tests (prick & intradermal & patch tests)    [] Penicillin G  [] Ampicillin [] Cefazolin   [] Ceftriaxone   [] Ceftazidime  [] Bactrim    [] Others:   Order for ... as test date    Atopy Screen (Placed Jul 9, 2024)  7/9/24: Patient took a cetirizine this AM and positive control was negative. Will perform at a future appointment after patient has held all antihistamines x 5-7 days.  No Substance Readings (15 min) Evaluation   POS Histamine 1mg/ml ++    NEG NaCl 0.9% - dermographism     No Substance Readings (15 min) Evaluation   1 Alternaria alternata (tenuis)  +    2 Cladosporium herbarum -    3 Aspergillus fumigatus -    4 Penicillium notatum -    5 Dermatophagoides  pteronyssinus +++    6 Dermatophagoides farinae ++    7 Dog epithelium (canis spp) -    8 Cat hair (elizabeth catus) +    9 Cockroach   (Blatella americana & germanica) -    10 Grass mix midwest   (Sarai, Orchard, Redtop, Rosendo) -    11 Otilio grass (sorghum halepense) -    12 Weed mix   (common Cocklebur, Lamb s quarters, rough redroot Pigweed, Dock/Sorrel) -    13 Mug wort (artemisia vulgare) -    14 Ragweed giant/short (ambrosia spp) -    15 White birch (Betula papyrifera) -    16 Tree mix 1 (Pecan, Maple BHR, Oak RVW, american [place], black [place]) -    17 Red cedar (juniperus virginia) -    18 Tree mix 2   (white Saw, river/red Birch, black [place], common [place], american Elm) -    19 Box elder/Maple mix (acer spp) -    20 [place] shagbark (carya ovata) -    Conclusion: Informational handout for HDM reduction provided.      Assessment & Plan:    ==> Final Diagnosis:     # Atopic predisposition with:  Perennial rhinitis with proven sensitization to dust mites  Seasonal aggravation in spring and fall with RC, RS, PND --> proven sensitization to snow mold Alternaria  Rhinitis around dogs (not to cats at home) --> prick test negative for dog, but positive for cat  * chronic illness with exacerbation, progression, side effects from treatment    These conclusions are made at the best of one's knowledge and belief based on the provided evidence such as patient's history and allergy test results and they can change over time or can be incomplete because of missing information.    ==> Treatment Plan:    >> For HDM allergy:  - Provided informational handout for house dust mite reduction.  - Start fexofenadine 180 mg tablet: 1 tablet PO QHS.  - Start Nasonex nasal spray: 2 sprays in each nostril QHS.  - Also provided informational handout for oral IT if above treatment is ineffective and if patient wishes to pursue.     Procedures Performed: Allergy prick tests    Staff Involved: Provider, Staff, Resident, and  Scribe    Scribe Disclosure:   I, DARIAN YUSUF, am serving as a scribe to document services personally performed by Ethan Brunson MD based on data collection and the provider's statements to me.     Staff Physician Comments:  I was present with the scribe who participated in the documentation of the note. I have verified the history and personally performed the physical exam and medical decision making. I agree with the assessment and plan as documented in the note. I have reviewed and if necessary amended the note.      Also, I saw and evaluated the patient with the resident and I agree with the assessment and plan as documented in the resident's note.    Ethan Brunson MD  Professor  Head of Dermato-Allergy Division  Department of Dermatology  [facility]     Follow-up in Derm-Allergy clinic in 2-3 months   - next scheduled to see referring provider (PCP) on 12/13/24  ___________________________    I spent a total of 34 minutes with Craig Martingeson during today s visit. This time was spent discussing all the individual test results, correlating them to the clinical relevance, counseling the patient and/or coordinating care and performing allergy tests.      Again, thank you for allowing me to participate in the care of your patient.        Sincerely,        Ethan Brunson MD

## 2024-08-06 NOTE — PROGRESS NOTES
[facility] Dermato-allergology Note  Office visit  Encounter Date: Aug 6, 2024  ____________________________________________    CC: Allergy Testing Followup (Atopy, off antihistamines )      HPI:  (Aug 6, 2024)  Mr. Craig Gusman is a(n) 34 year old male who presents today as a return patient for allergy tests as planned  - Follow-up in Derm-Allergy clinic for allergy tests as planned  - Confirmed patient has been off of antihistamines in preparation for this appointment  - Otherwise feeling well in usual state of health    Physical Exam:  General: In no acute distress, well-developed, well-nourished  Eyes: no conjunctivitis  ENT: no signs of rhinitis   Pulmonary: no wheezing or coughing  Skin: Focused examination of the skin on test sites was performed = see test results below  No active eczematous skin lesions on tests sites, particularly back    Earlier History and Allergy Exams:  (Jul 9, 2024)  New patient for allergy consultation  - Referred by Dr. Darwin Park ([facility]) for seasonal allergic rhinitis with unspecified trigger  - Provider referral comment: figuring out what to avoid   - Typically has stuffy, runny nose, red eyes, sinus pressure and drainage, PND (coughs to clear but otherwise does not cough) in spring (entire season) and fall (September & October)  - Sometimes wakes up with stuffy/runny nose in summer and winter  - Has 2 cats and no issues, but does have stuffy/runny nose around dogs  - Last antihistamine (cetirizine) taken this AM; takes daily perennially  - Symptoms are less severe with cetirizine but still break through  - Flonase used in the past with only some relief    Past Medical History:   Patient Active Problem List   Diagnosis    Chews tobacco     Past Medical History:   Diagnosis Date    Major depressive disorder, recurrent episode, in partial remission (H24)      Allergies:  No Known Allergies    Medications:  Current Outpatient Medications   Medication Sig  Dispense Refill    fexofenadine (ALLEGRA) 180 MG tablet Take 1 tablet (180 mg) by mouth at bedtime 30 tablet 3    mometasone (NASONEX) 50 MCG/ACT nasal spray Spray 2 sprays into both nostrils at bedtime 17 g 3    amphetamine-dextroamphetamine (ADDERALL XR) 15 MG 24 hr capsule Take 25 mg by mouth daily      buPROPion (WELLBUTRIN XL) 150 MG 24 hr tablet       cetirizine (ZYRTEC) 10 MG tablet Take 10 mg by mouth daily      clindamycin (CLEOCIN T) 1 % external lotion Apply topically 2 times daily (Patient not taking: Reported on 7/9/2024) 60 mL 4    loratadine (CLARITIN) 10 MG tablet Take 10 mg by mouth daily as needed for allergies (Patient not taking: Reported on 7/9/2024)       Current Facility-Administered Medications   Medication Dose Route Frequency Provider Last Rate Last Admin    lidocaine (XYLOCAINE) 2 % external gel   Urethral Once          Social History:  The patient works as a . Patient has the following hobbies or non-occupational exposure: N/A.    Family History:  Family History   Problem Relation Age of Onset    Hyperlipidemia Father     Crohn's Disease Sister     Coronary Artery Disease Early Onset Maternal Grandfather     No Known Problems Paternal Grandmother     Lung Cancer Paternal Grandfather         non-smoking   Positive for seasonal allergies, but he has the most acute seasonal allergy symptoms.    Previous Labs, Allergy Tests, Dermatopathology, Imaging:  [Upon review of [facility] chart, no prior allergist records as of 7/9/24.]    3/15/24 Dr. Alexis Montaño ([facility])  FROM [facility]:  The patient reports longstanding difficulty hearing in background noise. He feels hearing is symmetrical between the ears. He also reports history of swimmers ear primarily on the right (last infection was 15 years ago), and PE tubes as a child. Occasional bilateral tinnitus. Reports family history of hearing loss in his mother- unsure age of onset. 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  He denies aural pain, pressure, drainage, dizziness.     FROM A&P:  Normal hearing bilaterally.   Patient was counseled on good communication strategies, hearing loss and impact on communication.  Handout on good communication strategies was given to patient. It is recommended that the patient return to monitor hearing if changes are noted or new ear symptoms arise     2/27/24 ROSA ISELA Lopez (derm)  FROM [facility]:  Concern 5:  - Acne  - Longstanding  - Patient has difficulty avoiding picking at lesions    FROM A&P:  # Excoriated acne  - The patient expresses awareness of the picking and an effort to stop.   - Start clindamycin 1% lotion twice daily     12/13/23 Dr. Darwin Park ([facility])  FROM [facility]&P:  Seasonal allergic rhinitis, unspecified trigger  Adult Allergy/Asthma  Referral    Hearing disorder, unspecified laterality   Adult Audiology  Referral    9/13/22 Dr. Anurag Valle ([facility])  FROM [facility]:  Would like a referral for allergies and a referral to an audiologist    FROM A&P:  Seasonal allergic rhinitis, unspecified trigger  Comment: worse , so see  Plan: Adult Allergy/Asthma Referral, Adult ENT  Referral    Decreased hearing, unspecified laterality  Comment: see audiology  Plan: Adult ENT  Referral    [Note in Referrals tab in Epic that referrals were faxed to [facility] and [facility].]    Referred By: Darwin Park DO ([facility])  [address] [address]     Allergy Tests:  Past Allergy Test    Order for Future Allergy Testing:    [x] Outpatient  [] Inpatient: [facility]..../ Bed ....       Skin Atopy (atopic dermatitis) [x] Yes   [] No .........  Contact allergies:   [] Yes   [x] No ..........  Hand eczema:   [] Yes   [x] No           Leading hand:   [] R   [] L       [] Ambidextrous         Drug allergies:        [] Yes   [x] No  which?......    Urticaria/Angioedema  [] Yes   [x] No .........  Food Allergy:  [] Yes   [x] No  which?......  Pets :  [x] Yes   [] No   which?......         [x]  Rhinitis   [x] Conjunctivitis   [x] Sinusitis   [] Polyposis   [] Otitis   [] Pharyngitis         [x]  Postnasal drip    []  none  Operations:   [] Tonsils   [] Septum   [] Sinus   [] Polyposis        [] Asthma bronchiale   [x] Coughing      []  none  Symptoms (mostly rhinoconjunctivitis and asthma) aggravated by:  Season   [] I   [] II   [x] III   [x] IV   [x]V   []VI   []VII   []VIII   [x]IX   [x]X   []XI   []XII     [x] perennial   Day time      [] morning   [] noon      [] evening        [] night    [] whole day........  [x]  none  Location/changes    [] inside        [] outside   [] mountains    [] sea     [] others.............   [x]  none  Triggers, specific     [x] animals     [] plants     [] dust              [] others ...........................    []  none  Triggers, others       [] work          [] psyche    [] sport            [] others .............................  [x]  none  Irritant                [] phys efforts [] smoke    [] heat/cold     [] odors  []others............... [x]  none    Order for PATCH TESTS  Reason for tests (suspected allergy): not necessary at this time  Known previous allergies: none  Standardized panels  [] Standard panel (40 tests)  [] Preservatives & Antimicrobials (31 tests)  [] Emulsifiers & Additives (25 tests)   [] Perfumes/Flavours & Plants (25 tests)  [] Hairdresser panel (12 tests)  [] Rubber Chemicals (22 tests)  [] Plastics (26 tests)  [] Colorants/Dyes/Food additives (20 tests)  [] Metals (implants/dental) (24 tests)  [] Local anaesthetics/NSAIDs (13 tests)  [] Antibiotics & Antimycotics (14 tests)   [] Corticosteroids (15 tests)   [] Photopatch test (62 tests)   [] Others:       [] Patient's Own Products:   DO NOT test if chemical or biological identity is unknown!   always ask from patient the product information and safety sheets (MSDS)       Order for PRICK TESTS    Reason for tests (suspected allergy): perennial rhinitis; seasonal  aggravation in spring and fall with RC, RS, PND; clinical sensitization to dog epithelium (not to cats at home)  Known previous allergies: none    Standardized prick panels  [x] Atopic panel (20 tests)  [] Pediatric Panel (12 tests)  [] Milk, Meat, Eggs, Grains (20 tests)   [] Dust, Epithelia, Feathers (10 tests)  [] Fish, Seafood, Shellfish (17 tests)  [] Nuts, Beans (14 tests)  [] Spice, Vegetable, Fruit (17 tests)  [] Pollen Panel = Tree, Grass, Weed (24 tests)  [] Others:       [] Patient's Own Products:   DO NOT test if chemical or biological identity is unknown!   always ask from patient the product information and safety sheets (MSDS)     Standardized intradermal tests    [] Alternaria alternata  [] Aspergillus fumigatus  [] Cladosporium herbarum   [] Penicillium notatum  [] Dermatophagoides farinae  [] Dermatophagoides pteronyssinus  [] Dog Epithelium  [] Cat Epithelium  [] Others:     [] Bee venom   [] Wasp venom  !!Specific protocol with dilutions!!       Order for Drug allergy tests (prick & intradermal & patch tests)    [] Penicillin G  [] Ampicillin [] Cefazolin   [] Ceftriaxone   [] Ceftazidime  [] Bactrim    [] Others:   Order for ... as test date    Atopy Screen (Placed Jul 9, 2024)  7/9/24: Patient took a cetirizine this AM and positive control was negative. Will perform at a future appointment after patient has held all antihistamines x 5-7 days.  No Substance Readings (15 min) Evaluation   POS Histamine 1mg/ml ++    NEG NaCl 0.9% - dermographism     No Substance Readings (15 min) Evaluation   1 Alternaria alternata (tenuis)  +    2 Cladosporium herbarum -    3 Aspergillus fumigatus -    4 Penicillium notatum -    5 Dermatophagoides pteronyssinus +++    6 Dermatophagoides farinae ++    7 Dog epithelium (canis spp) -    8 Cat hair (elizabeth catus) +    9 Cockroach   (Blatella americana & germanica) -    10 Grass mix midwest   (Sarai, Orchard, Redtop, Rosendo) -    11 Otilio grass (sorghum halepense) -     12 Weed mix   (common Cocklebur, Lamb s quarters, rough redroot Pigweed, Dock/Sorrel) -    13 Mug wort (artemisia vulgare) -    14 Ragweed giant/short (ambrosia spp) -    15 White birch (Betula papyrifera) -    16 Tree mix 1 (Pecan, Maple BHR, Oak RVW, american [place], black [place]) -    17 Red cedar (juniperus virginia) -    18 Tree mix 2   (white Saw, river/red Birch, black [place], common [place], american Elm) -    19 Box elder/Maple mix (acer spp) -    20 [place] shagbark (carya ovata) -    Conclusion: Informational handout for HDM reduction provided.      Assessment & Plan:    ==> Final Diagnosis:     # Atopic predisposition with:  Perennial rhinitis with proven sensitization to dust mites  Seasonal aggravation in spring and fall with RC, RS, PND --> proven sensitization to snow mold Alternaria  Rhinitis around dogs (not to cats at home) --> prick test negative for dog, but positive for cat  * chronic illness with exacerbation, progression, side effects from treatment    These conclusions are made at the best of one's knowledge and belief based on the provided evidence such as patient's history and allergy test results and they can change over time or can be incomplete because of missing information.    ==> Treatment Plan:    >> For HDM allergy:  - Provided informational handout for house dust mite reduction.  - Start fexofenadine 180 mg tablet: 1 tablet PO QHS.  - Start Nasonex nasal spray: 2 sprays in each nostril QHS.  - Also provided informational handout for oral IT if above treatment is ineffective and if patient wishes to pursue.     Procedures Performed: Allergy prick tests    Staff Involved: Provider, Staff, Resident, and Scribe    Scribe Disclosure:   I, DARIAN YUSUF, am serving as a scribe to document services personally performed by Ethan Brunson MD based on data collection and the provider's statements to me.     Staff Physician Comments:  I was present with the scribe who participated in  the documentation of the note. I have verified the history and personally performed the physical exam and medical decision making. I agree with the assessment and plan as documented in the note. I have reviewed and if necessary amended the note.      Also, I saw and evaluated the patient with the resident and I agree with the assessment and plan as documented in the resident's note.    Ethan Brunson MD  Professor  Head of Dermato-Allergy Division  Department of Dermatology  [facility]     Follow-up in Derm-Allergy clinic in 2-3 months   - next scheduled to see referring provider (PCP) on 12/13/24  ___________________________    I spent a total of 34 minutes with Craig Gusman during today s visit. This time was spent discussing all the individual test results, correlating them to the clinical relevance, counseling the patient and/or coordinating care and performing allergy tests.

## 2024-08-06 NOTE — PATIENT INSTRUCTIONS
No Substance Readings (15 min) Evaluation   POS Histamine 1mg/ml ++     NEG NaCl 0.9% - dermographism      No Substance Readings (15 min) Evaluation   1 Alternaria alternata (tenuis)  +     2 Cladosporium herbarum -     3 Aspergillus fumigatus -     4 Penicillium notatum -     5 Dermatophagoides pteronyssinus +++     6 Dermatophagoides farinae ++     7 Dog epithelium (canis spp) -     8 Cat hair (elizabeth catus) +     9 Cockroach   (Blatella americana & germanica) -     10 Grass mix midwest   (Sarai, Orchard, Redtop, Rosendo) -     11 Otilio grass (sorghum halepense) -     12 Weed mix   (common Cocklebur, Lamb s quarters, rough redroot Pigweed, Dock/Sorrel) -     13 Mug wort (artemisia vulgare) -     14 Ragweed giant/short (ambrosia spp) -     15 White birch (Betula papyrifera) -     16 Tree mix 1 (Pecan, Maple BHR, Oak RVW, american [place], black [place]) -     17 Red cedar (juniperus virginia) -     18 Tree mix 2   (white Saw, river/red Birch, black [place], common [place], american Elm) -     19 Box elder/Maple mix (acer spp) -     20 [place] shagbark (carya ovata) -         House Dust Mite Allergy        The house dust mite is an arachnid about 0.3 mm in size and not visible to the naked eye. There are around 150 species of house dust mites in the world. One mite produces up to 40 fecal droppings a day. One teaspoonful of bedroom dust contains an average of nearly 1000 mites and 250,000 minute droppings.    Causes and triggers of house dust mite allergy  The house dust mite requires a warm, moist environment without light in order to live and reproduce. Our beds are ideal. The mite feeds on human and animal skin scales. The allergen is mainly contained in the mite's feces. The feces contain allergy-triggering constituents which are spread in fine dust, are breathed in and can cause an allergic reaction.    Symptoms  When the allergens come into contact with the mucous membranes in the eyes, nose, mouth and  throat, sufferers develop symptoms typical of an allergic cold (allergic rhinitis) or an allergic inflammation of the conjunctiva (allergic conjunctivitis): blocked or runny nose, sneezing, red, itchy eyes. If all of these symptoms are present, then the condition is also known as rhinoconjunctivitis. Often, the upper respiratory tract becomes chronically inflamed, primarily because house dust mites are present all year round.  The symptoms of house dust mite allergy typically occur in the morning and are more frequent in the cold months of the year.    Therapy and treatment  As a first step, mattress, pillows and duvet/comforter should be placed in mite-proof or anti-mite covers, sometimes known as encasings. Alternatively you can use pillows or comforter that can be washed at over 130 F monthly. At the same time, house dust should be minimized. If necessary, the symptoms can be treated with medication, for example antihistamines in the form of nasal sprays, eye drops and tablets. Desensitization/specific immunotherapy (SIT) is recommended for house dust allergy if all the measures above are not sufficient.    Tips and tricks:  Keep room temperature at 66-70 F and relative air humidity at a maximum of 50%.  Ideally, thoroughly air your home two to three times a day for 5 to 10 minutes each time.  Wash bed linens in at least 130 F every week.  Remove stuffed animals or freeze them every other week.  Keep ceiling fans off in the bedroom as they can stir up dust mite allergens.  Remove dust from furniture with a damp cloth and regularly wet mop floors.  Do not put pot and hydroponic plants in the bedroom and also avoid putting too many in living areas, as they increase room humidity.  When staying overnight in other accommodations, we recommend taking your own bed linen and the above anti-mite mattress covers with you.  Remove upholstered furniture from the bedroom and consider removing the carpets. Ideally, use sealed  "parquet or laminate reymundo, cork tiles or reymundo made of wood, novilon or PVC.  Maybe additionally reduce dust mites in mattress, upholstery, or reymundo using hot steam .      Modified from \"House Dust Mite Allergy\" by [employer] [facility].                     We schedule you for an hour long nurse visit appointment (Dr. Brunson is present in clinic, but you don't have to pay for a doctors visit), once that is scheduled, our pharmacy will reach out to you to come in and sign for the medications and prove that you have a non- emergency set (epi pen, prednisone and zyrtec), or we order them through our pharmacy if you don't currently have them, then you show up for your scheduled appointment with us in clinic. At that appointment we will go over how to use the emergency set of medications just in case, then we go over how to take the pills, etc, then you will take the first dose and wait in clinic for 30 minutes for observation.   "

## 2024-08-14 ENCOUNTER — VIRTUAL VISIT (OUTPATIENT)
Dept: PSYCHOLOGY | Facility: CLINIC | Age: 34
End: 2024-08-14
Payer: COMMERCIAL

## 2024-08-14 DIAGNOSIS — F33.41 RECURRENT MAJOR DEPRESSIVE DISORDER, IN PARTIAL REMISSION (H): Primary | ICD-10-CM

## 2024-08-14 PROCEDURE — 90834 PSYTX W PT 45 MINUTES: CPT | Mod: 95 | Performed by: STUDENT IN AN ORGANIZED HEALTH CARE EDUCATION/TRAINING PROGRAM

## 2024-08-14 NOTE — PROGRESS NOTES
[facility]                                     Progress Note    Patient Name: Craig Gusman  Date: 08/14/2024         Service Type: Individual      Session Start Time: 12.30  Session End Time: 12.55     Session Length: 25    Session #: 08    Attendees: Client attended alone    Service Modality:  Video Visit:      Provider verified identity through the following two step process.  Patient provided:  Patient is known previously to provider    Telemedicine Visit: The patient's condition can be safely assessed and treated via synchronous audio and visual telemedicine encounter.      Reason for Telemedicine Visit: Patient has requested telehealth visit    Originating Site (Patient Location): Patient's home    Distant Site (Provider Location): Provider Remote Setting- Home Office    Consent:  The patient/guardian has verbally consented to: the potential risks and benefits of telemedicine (video visit) versus in person care; bill my insurance or make self-payment for services provided; and responsibility for payment of non-covered services.     Patient would like the video invitation sent by:  My Chart    Mode of Communication:  Video Conference via Amwell    Distant Location (Provider):  Off-site    As the provider I attest to compliance with applicable laws and regulations related to telemedicine.    DATA  Interactive Complexity: No  Crisis: No        Progress Since Last Session (Related to Symptoms / Goals / Homework):   Symptoms: No change pt reported no change    Homework: Completed in session review patient's  application of behavior activation strategies and positively reinforced observable changes.      Episode of Care Goals: Satisfactory progress - MAINTENANCE (Working to maintain change, with risk of relapse); Intervened by continuing to positively reinforce healthy behavior choice      Current / Ongoing Stressors and Concerns:        Current: Today, patient reported an improvement in  mood since last session. 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 He reported that is the happiest he has been at a job. He noted he has not been having enough sleep and wants to improve on his sleep health.      Treatment Objective(s) Addressed in This Session:     Patient will increase daily activity level by exercising for 20-30 minutes and participate in at least 1 daily pleasant/fun activities.  Patient will practice healthy sleep habits daily.      Intervention:    Psychoeducation: Discussed the different factors that may impact good sleep. Discussed unhealthy sleep habits  like napping, unhealthy bedtime routines, use of mobile phones late at night; and how all these could lead to sleeplessness and poor daytime functioning. Provider provided patient with healthy sleep hygiene tips to practice: maintaining a specific sleep time daily, sleep when sleepy, avoid caffeine and alcohol close to bedtime, and avoid daytime naps.Encourage patient to take daily walks outside to increase melatonin production etc.      ASSESSMENT: Current Emotional / Mental Status (status of significant symptoms):   Risk status (Self / Other harm or suicidal ideation)   Patient denies current fears or concerns for personal safety.   Patient denies current or recent suicidal ideation or behaviors.   Patient denies current or recent homicidal ideation or behaviors.   Patient denies current or recent self injurious behavior or ideation.   Patient denies other safety concerns.   Patient reports there has been no change in risk factors since their last session.     Patient reports there has been no change in protective factors since their last session.     Recommended that patient call 911 or go to the local ED should there be a change in any of these risk factors.     Appearance:   Appropriate    Eye Contact:   Good    Psychomotor Behavior: Normal    Attitude:   Cooperative   Interested   Orientation:   All   Speech    Rate / Production: Normal/ Responsive    Volume:  Normal    Mood:    Depressed    Affect:    Appropriate    Thought Content:  Clear    Thought Form:  Coherent  Goal Directed    Insight:    Good      Medication Review:   No changes to current psychiatric medication(s)     Medication Compliance:   Yes     Changes in Health Issues:   None reported     Chemical Use Review:   Substance Use: Chemical use reviewed, no active concerns identified      Tobacco Use: No change in amount of tobacco use since last session.  Patient assessed present costs and future losses as a result of smoking    Diagnosis:  296.35 (F33.41)  Major Depressive Disorder, Recurrent Episode, In partial remission _ and With atypical features    Collateral Reports Completed:   Not Applicable    PLAN: (Patient Tasks / Therapist Tasks / Other)    Use sleep hygiene tips discussed in session daily.         ALEX Floyd            ______________________________________________________________________    Individual Treatment Plan    Patient's Name: Craig Gusman  YOB: 1990    Date of Creation: 04/03/2024  Date Treatment Plan Last Reviewed/Revised:     DSM5 Diagnoses: 296.35 (F33.41)  Major Depressive Disorder, Recurrent Episode, In partial remission _ and With atypical features  Psychosocial / Contextual Factors: unemployment  PROMIS (reviewed every 90 days):     Referral / Collaboration:  Referral to another professional/service is not indicated at this time..    Anticipated number of session for this episode of care:  15-25  Anticipation frequency of session: Biweekly  Anticipated Duration of each session: 38-52 minutes  Treatment plan will be reviewed in 90 days or when goals have been changed.       MeasurableTreatment Goal(s) related to diagnosis / functional impairment(s)    Goal 1: Patient will identify and address specific triggers to feelings of depression and improve coping by  90 % in  "the next three months.      I will know I've met my goal when I am no longer in the [place] that I am in right now and less depressed and not putting a lot of mental efforts into basic activities\"     Objective #A (Patient Action)    Patient will increase daily activity level by exercising for 20-30 minutes and participate in at least 1 daily pleasant/fun activities.  Status: Continued - Date(s): 11/14/2024    Intervention(s)  Therapist will provide psychoeducation, behavioral activation, and cognitive restructuring.    Objective #B  Patient will identify specific areas of cognitive distortion and challenge irrational thoughts with reality.   Status: Continued - Date(s):11/14/2024       Intervention(s)  Therapist will provide psychoeducation, behavioral activation, and cognitive restructuring.    Objective #A (Patient Action)    Patient will maintain a regular sleep and wake pattern daily.  Status: Continued - Date(s):11/14/2024    Intervention(s)  Therapist will provide psychoeducation, behavioral activation, and cognitive restructuring.    Objective #C  Patient will learn and practice relaxation techniques to manage depression.  Status: Continued - Date(s):  11/14/2024      Intervention(s)  Therapist will provide psychoeducation, behavioral activation, and cognitive restructuring.      Patient has reviewed and agreed to the above plan.      Darrel Yoder [facility]  April 3, 2024    Answers submitted by the patient for this visit:  MALVIN-7 (Submitted on 3/27/2024)  MALVIN 7 TOTAL SCORE: 12    "

## 2024-09-19 ENCOUNTER — VIRTUAL VISIT (OUTPATIENT)
Dept: PSYCHOLOGY | Facility: CLINIC | Age: 34
End: 2024-09-19
Payer: COMMERCIAL

## 2024-09-19 DIAGNOSIS — F32.0 CURRENT MILD EPISODE OF MAJOR DEPRESSIVE DISORDER, UNSPECIFIED WHETHER RECURRENT (H): Primary | ICD-10-CM

## 2024-09-19 PROCEDURE — 90834 PSYTX W PT 45 MINUTES: CPT | Mod: 95 | Performed by: STUDENT IN AN ORGANIZED HEALTH CARE EDUCATION/TRAINING PROGRAM

## 2024-09-19 NOTE — PROGRESS NOTES
[facility]                                     Progress Note    Patient Name: Craig Gusman  Date: 09/19/2024         Service Type: Individual      Session Start Time: 12.30  Session End Time: 12.55     Session Length: 25    Session #: 09    Attendees: Client attended alone    Service Modality:  Video Visit:      Provider verified identity through the following two step process.  Patient provided:  Patient is known previously to provider    Telemedicine Visit: The patient's condition can be safely assessed and treated via synchronous audio and visual telemedicine encounter.      Reason for Telemedicine Visit: Patient has requested telehealth visit    Originating Site (Patient Location): Patient's home    Distant Site (Provider Location): Provider Remote Setting- Home Office    Consent:  The patient/guardian has verbally consented to: the potential risks and benefits of telemedicine (video visit) versus in person care; bill my insurance or make self-payment for services provided; and responsibility for payment of non-covered services.     Patient would like the video invitation sent by:  My Chart    Mode of Communication:  Video Conference via Amwell    Distant Location (Provider):  Off-site    As the provider I attest to compliance with applicable laws and regulations related to telemedicine.    DATA  Interactive Complexity: No  Crisis: No        Progress Since Last Session (Related to Symptoms / Goals / Homework):   Symptoms:  patient reported worsening mood following recent loss.    Homework: Completed in session review patient's  application of behavior activation strategies and positively reinforced observable changes.      Episode of Care Goals: Satisfactory progress - MAINTENANCE (Working to maintain change, with risk of relapse); Intervened by continuing to positively reinforce healthy behavior choice      Current / Ongoing Stressors and Concerns:        Current: Today, patient  reported that he an his wife had a lost and that she had a miscarriage of an 18 months pregnancy. He noted tat this is the second tome they are going through a similar loss. He reported its been very hard on both of them as a couple and particularly his wife. He noted he tries to be there and present with her daily, going for walks together and being intentional to check in and process what they are both feeling daily.       Treatment Objective(s) Addressed in This Session:     Patient will increase daily activity level  participate in at least 1 daily pleasant/fun activities.  Patient will practice healthy sleep habits daily.      Intervention:     MI Intervention: Expressed Empathy/Understanding, Supported Autonomy, Collaboration, Evocation, Permission to raise concern or advise, Open-ended questions, and Reflections: simple and complex Encourage patient to explore support groups for couples with similar experiences.           ASSESSMENT: Current Emotional / Mental Status (status of significant symptoms):   Risk status (Self / Other harm or suicidal ideation)   Patient denies current fears or concerns for personal safety.   Patient denies current or recent suicidal ideation or behaviors.   Patient denies current or recent homicidal ideation or behaviors.   Patient denies current or recent self injurious behavior or ideation.   Patient denies other safety concerns.   Patient reports there has been no change in risk factors since their last session.     Patient reports there has been no change in protective factors since their last session.     Recommended that patient call 911 or go to the local ED should there be a change in any of these risk factors.     Appearance:   Appropriate    Eye Contact:   Good    Psychomotor Behavior: Normal    Attitude:   Cooperative  Interested   Orientation:   All   Speech    Rate / Production: Normal/ Responsive    Volume:  Normal    Mood:    Depressed    Affect:    Appropriate     Thought Content:  Clear    Thought Form:  Coherent  Goal Directed    Insight:    Good      Medication Review:   No changes to current psychiatric medication(s)     Medication Compliance:   Yes     Changes in Health Issues:   None reported     Chemical Use Review:   Substance Use: Chemical use reviewed, no active concerns identified      Tobacco Use: No change in amount of tobacco use since last session.  Patient assessed present costs and future losses as a result of smoking    Diagnosis:  296.35 (F33.41)  Major Depressive Disorder, Recurrent Episode, In partial remission _ and With atypical features    Collateral Reports Completed:   Not Applicable    PLAN: (Patient Tasks / Therapist Tasks / Other)    Use sleep hygiene tips discussed in session daily.     Explore and attend support groups for couples with a history of miscarriages.         Darrel Yoder [facility]            ______________________________________________________________________    Individual Treatment Plan    Patient's Name: Craig Gusman  YOB: 1990    Date of Creation: 04/03/2024  Date Treatment Plan Last Reviewed/Revised:     DSM5 Diagnoses: 296.35 (F33.41)  Major Depressive Disorder, Recurrent Episode, In partial remission _ and With atypical features  Psychosocial / Contextual Factors: unemployment  PROMIS (reviewed every 90 days):     Referral / Collaboration:  Referral to another professional/service is not indicated at this time..    Anticipated number of session for this episode of care:  15-25  Anticipation frequency of session: Biweekly  Anticipated Duration of each session: 38-52 minutes  Treatment plan will be reviewed in 90 days or when goals have been changed.       MeasurableTreatment Goal(s) related to diagnosis / functional impairment(s)    Goal 1: Patient will identify and address specific triggers to feelings of depression and improve coping by  90 % in the next three months.      I will know I've met my goal when I am  "no longer in the [place] that I am in right now and less depressed and not putting a lot of mental efforts into basic activities\"     Objective #A (Patient Action)    Patient will increase daily activity level by exercising for 20-30 minutes and participate in at least 1 daily pleasant/fun activities.  Status: Continued - Date(s): 11/14/2024    Intervention(s)  Therapist will provide psychoeducation, behavioral activation, and cognitive restructuring.    Objective #B  Patient will identify specific areas of cognitive distortion and challenge irrational thoughts with reality.   Status: Continued - Date(s):11/14/2024       Intervention(s)  Therapist will provide psychoeducation, behavioral activation, and cognitive restructuring.    Objective #A (Patient Action)    Patient will maintain a regular sleep and wake pattern daily.  Status: Continued - Date(s):11/14/2024    Intervention(s)  Therapist will provide psychoeducation, behavioral activation, and cognitive restructuring.    Objective #C  Patient will learn and practice relaxation techniques to manage depression.  Status: Continued - Date(s):  11/14/2024      Intervention(s)  Therapist will provide psychoeducation, behavioral activation, and cognitive restructuring.      Patient has reviewed and agreed to the above plan.      Darrel Yoder [facility]  April 3, 2024    Answers submitted by the patient for this visit:  MALVIN-7 (Submitted on 3/27/2024)  MALVIN 7 TOTAL SCORE: 12    "

## 2024-10-30 ENCOUNTER — VIRTUAL VISIT (OUTPATIENT)
Dept: ALLERGY | Facility: CLINIC | Age: 34
End: 2024-10-30
Payer: COMMERCIAL

## 2024-10-30 DIAGNOSIS — Z88.9 ATOPY: ICD-10-CM

## 2024-10-30 DIAGNOSIS — J32.9 CHRONIC RHINOSINUSITIS: ICD-10-CM

## 2024-10-30 DIAGNOSIS — Z91.09 HOUSE DUST MITE ALLERGY: Primary | ICD-10-CM

## 2024-10-30 DIAGNOSIS — R09.82 ALLERGIC RHINITIS WITH POSTNASAL DRIP: ICD-10-CM

## 2024-10-30 DIAGNOSIS — J30.81 PET ALLERGY: ICD-10-CM

## 2024-10-30 DIAGNOSIS — J30.9 ALLERGIC RHINITIS WITH POSTNASAL DRIP: ICD-10-CM

## 2024-10-30 PROCEDURE — 99213 OFFICE O/P EST LOW 20 MIN: CPT | Mod: 95 | Performed by: DERMATOLOGY

## 2024-10-30 NOTE — PATIENT INSTRUCTIONS
You can always access your most recent office visit note with this clinic via [email or web address].  If you do not have access to [employer], please discuss with a [place] staff member.

## 2024-10-30 NOTE — LETTER
10/30/2024      Craig Gusman  [address] [place] [address]      Dear Colleague,    Thank you for referring your patient, Craig Gusman, to the [facility] [place]. Please see a copy of my visit note below.    [facility] Dermato-allergology Note  Virtual visit: store and forward video (Lev[employer]art connected), start time: 09:40, end time: 09:58, date of images: during visit  Encounter Date: Oct 30, 2024  ____________________________________________    CC: Allergy Testing Followup (When leaves started falling had sinus congestion, cetrizine didn't help, stopped after a week, hasn't started nasal spray, minimally using HDM reduction measures. Other than that week, hasn't really had symptoms. )      HPI:  (Oct 30, 2024)  Mr. Craig Gusman is a(n) 34 year old male who presents today for follow-up  for allergy consultation  - Follow-up in Derm-Allergy clinic in 2-3 months   - Overall, symptoms have been fine  - Did not start nasal spray  - Other than first week of fall when leaves started to fall, which during that time he had nasal congestion and took cetirizine, then he stopped cetirizine and has not had issues  - Has not been using fans in house, and he and wife have been diligent with vacuuming house  - Has not yet encased mattress  - Last week, he was outside for work in [facility], and did not have any issues, and that's while he was not taking cetirizine  - Otherwise feeling well in usual state of health    Physical Exam:  General: In no acute distress, well-developed, well-nourished  Eyes: no conjunctivitis  ENT: no signs of rhinitis   Pulmonary: no wheezing or coughing  Skin: no active eczematous skin lesions on visible skin     Earlier History and Allergy Exams:  (Aug 6, 2024)  - Follow-up in Derm-Allergy clinic for allergy tests as planned  - Confirmed patient has been off of antihistamines in preparation for this appointment    (Jul 9, 2024)  New  patient for allergy consultation  - Referred by Dr. Darwin Park (FP) for seasonal allergic rhinitis with unspecified trigger  - Provider referral comment: figuring out what to avoid   - Typically has stuffy, runny nose, red eyes, sinus pressure and drainage, PND (coughs to clear but otherwise does not cough) in spring (entire season) and fall (September & October)  - Sometimes wakes up with stuffy/runny nose in summer and winter  - Has 2 cats and no issues, but does have stuffy/runny nose around dogs  - Last antihistamine (cetirizine) taken this AM; takes daily perennially  - Symptoms are less severe with cetirizine but still break through  - Flonase used in the past with only some relief    Past Medical History:   Patient Active Problem List   Diagnosis     Chews tobacco     Past Medical History:   Diagnosis Date     Major depressive disorder, recurrent episode, in partial remission (H)      Allergies:  No Known Allergies    Medications:  Current Outpatient Medications   Medication Sig Dispense Refill     amphetamine-dextroamphetamine (ADDERALL XR) 15 MG 24 hr capsule Take 25 mg by mouth daily       buPROPion (WELLBUTRIN XL) 150 MG 24 hr tablet        cetirizine (ZYRTEC) 10 MG tablet Take 10 mg by mouth daily       clindamycin (CLEOCIN T) 1 % external lotion Apply topically 2 times daily (Patient not taking: Reported on 7/9/2024) 60 mL 4     fexofenadine (ALLEGRA) 180 MG tablet Take 1 tablet (180 mg) by mouth at bedtime 30 tablet 3     loratadine (CLARITIN) 10 MG tablet Take 10 mg by mouth daily as needed for allergies (Patient not taking: Reported on 7/9/2024)       mometasone (NASONEX) 50 MCG/ACT nasal spray Spray 2 sprays into both nostrils at bedtime 17 g 3     Current Facility-Administered Medications   Medication Dose Route Frequency Provider Last Rate Last Admin     lidocaine (XYLOCAINE) 2 % external gel   Urethral Once          Social History:  The patient works as a . Patient has the  following hobbies or non-occupational exposure: N/A.    Family History:  Family History   Problem Relation Age of Onset     Hyperlipidemia Father      Crohn's Disease Sister      Coronary Artery Disease Early Onset Maternal Grandfather      No Known Problems Paternal Grandmother      Lung Cancer Paternal Grandfather         non-smoking   Positive for seasonal allergies, but he has the most acute seasonal allergy symptoms.    Previous Labs, Allergy Tests, Dermatopathology, Imaging:  [Upon review of [facility] chart, no prior allergist records as of 7/9/24.]    3/15/24 Dr. Alexis Montaño (aud)  FROM \[facility]\"":  The patient reports longstanding difficulty hearing in background noise. He feels hearing is symmetrical between the ears. He also reports history of swimmers ear primarily on the right (last infection was 15 years ago), and PE tubes as a child. Occasional bilateral tinnitus. Reports family history of hearing loss in his mother- unsure age of onset. 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 He denies aural pain, pressure, drainage, dizziness.     FROM A&P:  Normal hearing bilaterally.   Patient was counseled on good communication strategies, hearing loss and impact on communication.  Handout on good communication strategies was given to patient. It is recommended that the patient return to monitor hearing if changes are noted or new ear symptoms arise     2/27/24 ROSA ISELA Lopez (derm)  FROM \[facility]\"":  Concern 5:  - Acne  - Longstanding  - Patient has difficulty avoiding picking at lesions    FROM A&P:  # Excoriated acne  - The patient expresses awareness of the picking and an effort to stop.   - Start clindamycin 1% lotion twice daily     12/13/23 Dr. Darwin Park (FP)  FROM A&P:  Seasonal allergic rhinitis, unspecified trigger  Adult Allergy/Asthma  Referral    Hearing disorder, unspecified laterality   Adult Audiology  Referral    9/13/22 Dr. Anurag Valle (FP)  FROM \[facility]\"":  Would  like a referral for allergies and a referral to an audiologist    FROM A&P:  Seasonal allergic rhinitis, unspecified trigger  Comment: worse , so see  Plan: Adult Allergy/Asthma Referral, Adult ENT  Referral    Decreased hearing, unspecified laterality  Comment: see audiology  Plan: Adult ENT  Referral    [Note in Referrals tab in Epic that ref[facility] were [facility].]    Referred By: Darwin Park DO ([facility]) [address] [place] [address]     Allergy Tests:  Past Allergy Test    Order for Future Allergy Testing:    [x] Outpatient  [] Inpatient: [facility]..../ Bed ....       Skin Atopy (atopic dermatitis) [x] Yes   [] No .........  Contact allergies:   [] Yes   [x] No ..........  Hand eczema:   [] Yes   [x] No           Leading hand:   [] R   [] L       [] Ambidextrous         Drug allergies:        [] Yes   [x] No  which?......    Urticaria/Angioedema  [] Yes   [x] No .........  Food Allergy:  [] Yes   [x] No  which?......  Pets :  [x] Yes   [] No  which?......         [x]  Rhinitis   [x] Conjunctivitis   [x] Sinusitis   [] Polyposis   [] Otitis   [] Pharyngitis         [x]  Postnasal drip    []  none  Operations:   [] Tonsils   [] Septum   [] Sinus   [] Polyposis        [] Asthma bronchiale   [x] Coughing      []  none  Symptoms (mostly rhinoconjunctivitis and asthma) aggravated by:  Season   [] I   [] II   [x] III   [x] IV   [x]V   []VI   []VII   []VIII   [x]IX   [x]X   []XI   []XII     [x] perennial   Day time      [] morning   [] noon      [] evening        [] night    [] whole day........  [x]  none  Location/changes    [] inside        [] outside   [] mountains    [] sea     [] others.............   [x]  none  Triggers, specific     [x] animals     [] plants     [] dust              [] others ...........................    []  none  Triggers, others       [] work          [] psyche    [] sport            [] others .............................  [x]   none  Irritant                [] phys efforts [] smoke    [] heat/cold     [] odors  []others............... [x]  none    Order for PATCH TESTS  Reason for tests (suspected allergy): not necessary at this time  Known previous allergies: none  Standardized panels  [] Standard panel (40 tests)  [] Preservatives & Antimicrobials (31 tests)  [] Emulsifiers & Additives (25 tests)   [] Perfumes/Flavours & Plants (25 tests)  [] Hairdresser panel (12 tests)  [] Rubber Chemicals (22 tests)  [] Plastics (26 tests)  [] Colorants/Dyes/Food additives (20 tests)  [] Metals (implants/dental) (24 tests)  [] Local anaesthetics/NSAIDs (13 tests)  [] Antibiotics & Antimycotics (14 tests)   [] Corticosteroids (15 tests)   [] Photopatch test (62 tests)   [] Others:       [] Patient's Own Products:   DO NOT test if chemical or biological identity is unknown!   always ask from patient the product information and safety sheets (MSDS)       Order for PRICK TESTS    Reason for tests (suspected allergy): perennial rhinitis; seasonal aggravation in spring and fall with RC, RS, PND; clinical sensitization to dog epithelium (not to cats at home)  Known previous allergies: none    Standardized prick panels  [x] Atopic panel (20 tests)  [] Pediatric Panel (12 tests)  [] Milk, Meat, Eggs, Grains (20 tests)   [] Dust, Epithelia, Feathers (10 tests)  [] Fish, Seafood, Shellfish (17 tests)  [] Nuts, Beans (14 tests)  [] Spice, Vegetable, Fruit (17 tests)  [] Pollen Panel = Tree, Grass, Weed (24 tests)  [] Others:       [] Patient's Own Products:   DO NOT test if chemical or biological identity is unknown!   always ask from patient the product information and safety sheets (MSDS)     Standardized intradermal tests    [] Alternaria alternata  [] Aspergillus fumigatus  [] Cladosporium herbarum   [] Penicillium notatum  [] Dermatophagoides farinae  [] Dermatophagoides pteronyssinus  [] Dog Epithelium  [] Cat Epithelium  [] Others:     [] Bee venom   []  Wasp venom  !!Specific protocol with dilutions!!       Order for Drug allergy tests (prick & intradermal & patch tests)    [] Penicillin G  [] Ampicillin [] Cefazolin   [] Ceftriaxone   [] Ceftazidime  [] Bactrim    [] Others:   Order for ... as test date      Atopy Screen (Placed Jul 9, 2024)  7/9/24: Patient took a cetirizine this AM and positive control was negative. Will perform at a future appointment after patient has held all antihistamines x 5-7 days.  No Substance Readings (15 min) Evaluation   POS Histamine 1mg/ml ++    NEG NaCl 0.9% - dermographism     No Substance Readings (15 min) Evaluation   1 Alternaria alternata (tenuis)  +    2 Cladosporium herbarum -    3 Aspergillus fumigatus -    4 Penicillium notatum -    5 Dermatophagoides pteronyssinus +++    6 Dermatophagoides farinae ++    7 Dog epithelium (canis spp) -    8 Cat hair (elizabeth catus) +    9 Cockroach   (Blatella americana & germanica) -    10 Grass mix midwest   (Sarai, Orchard, Redtop, Rosendo) -    11 Otilio grass (sorghum halepense) -    12 Weed mix   (common Cocklebur, Lamb s quarters, rough redroot Pigweed, Dock/Sorrel) -    13 Mug wort (artemisia vulgare) -    14 Ragweed giant/short (ambrosia spp) -    15 White birch (Betula papyrifera) -    16 Tree mix 1 (Pecan, Maple BHR, Oak RVW, american [place], black [place]) -    17 Red cedar (juniperus virginia) -    18 Tree mix 2   (white Saw, river/red Birch, black Tonalea, common [place], american Elm) -    19 Box elder/Maple mix (acer spp) -    20 [place] shagbark (carya ovata) -    Conclusion: Informational handout for HDM reduction provided.      Assessment & Plan:    ==> Final Diagnosis:     # Atopic predisposition with:  Perennial rhinitis with proven sensitization to dust mites  Seasonal aggravation in spring and fall with RC, RS, PND --> proven sensitization to snow mold Alternaria  Rhinitis around dogs (not to cats at home) --> prick test negative for dog, but positive for cat  *  chronic illness with exacerbation, progression, side effects from treatment    These conclusions are made at the best of one's knowledge and belief based on the provided evidence such as patient's history and allergy test results and they can change over time or can be incomplete because of missing information.    ==> Treatment Plan:    >> For HDM allergy:  - Continue with house dust mite reduction as previously discussed. Encouraged mattress and pillow encasements.   - If he chooses to take OTC antihistamine and/or use OTC nasal spray, recommend he take/use in the evening for about 1 month when symptoms are more active.  - Future Considerations if symptoms flare and are uncontrolled: Odactra (informational handout previously provided on 8/6/24).    Procedures Performed: None    Staff and Scribe: provider    Scribe Disclosure:   I, DARIAN YUSUF, am serving as a scribe to document services personally performed by Ethan Brunson MD based on data collection and the provider's statements to me.     Staff Physician Comments:  I was present with the scribe who participated in the documentation of the note. I have verified the history and personally performed the physical exam and medical decision making. I agree with the assessment and plan as documented in the note. I have reviewed and if necessary amended the note.      Ethan Brunson MD  Professor  Head of Dermato-Allergy Division  Department of Dermatology  [facility]     Follow-up in Derm-Allergy clinic PRN  - next scheduled to see referring provider (PCP) on 12/13/24  ___________________________    I spent a total of 18 minutes with Craig Gusman during today s visit. This time was spent discussing all the individual test results, correlating them to the clinical relevance, counseling the patient and/or coordinating care.      Again, thank you for allowing me to participate in the care of your patient.         Sincerely,        Ethan Brunson MD

## 2024-10-30 NOTE — PROGRESS NOTES
[facility] Dermato-allergology Note  Virtual visit: store and forward video (Coravinhart connected), start time: 09:40, end time: 09:58, date of images: during visit  Encounter Date: Oct 30, 2024  ____________________________________________    CC: Allergy Testing Followup (When leaves started falling had sinus congestion, cetrizine didn't help, stopped after a week, hasn't started nasal spray, minimally using HDM reduction measures. Other than that week, hasn't really had symptoms. )      HPI:  (Oct 30, 2024)  Mr. Craig Gusman is a(n) 34 year old male who presents today for follow-up  for allergy consultation  - Follow-up in Derm-Allergy clinic in 2-3 months   - Overall, symptoms have been fine  - Did not start nasal spray  - Other than first week of fall when leaves started to fall, which during that time he had nasal congestion and took cetirizine, then he stopped cetirizine and has not had issues  - Has not been using fans in house, and he and wife have been diligent with vacuuming house  - Has not yet encased mattress  - Last week, he was outside for work in [facility], and did not have any issues, and that's while he was not taking cetirizine  - Otherwise feeling well in usual state of health    Physical Exam:  General: In no acute distress, well-developed, well-nourished  Eyes: no conjunctivitis  ENT: no signs of rhinitis   Pulmonary: no wheezing or coughing  Skin: no active eczematous skin lesions on visible skin     Earlier History and Allergy Exams:  (Aug 6, 2024)  - Follow-up in Derm-Allergy clinic for allergy tests as planned  - Confirmed patient has been off of antihistamines in preparation for this appointment    (Jul 9, 2024)  New patient for allergy consultation  - Referred by Dr. Darwin Park (FP) for seasonal allergic rhinitis with unspecified trigger  - Provider referral comment: figuring out what to avoid   - Typically has stuffy, runny nose, red eyes, sinus pressure and  drainage, PND (coughs to clear but otherwise does not cough) in spring (entire season) and fall (September & October)  - Sometimes wakes up with stuffy/runny nose in summer and winter  - Has 2 cats and no issues, but does have stuffy/runny nose around dogs  - Last antihistamine (cetirizine) taken this AM; takes daily perennially  - Symptoms are less severe with cetirizine but still break through  - Flonase used in the past with only some relief    Past Medical History:   Patient Active Problem List   Diagnosis    Chews tobacco     Past Medical History:   Diagnosis Date    Major depressive disorder, recurrent episode, in partial remission (H)      Allergies:  No Known Allergies    Medications:  Current Outpatient Medications   Medication Sig Dispense Refill    amphetamine-dextroamphetamine (ADDERALL XR) 15 MG 24 hr capsule Take 25 mg by mouth daily      buPROPion (WELLBUTRIN XL) 150 MG 24 hr tablet       cetirizine (ZYRTEC) 10 MG tablet Take 10 mg by mouth daily      clindamycin (CLEOCIN T) 1 % external lotion Apply topically 2 times daily (Patient not taking: Reported on 7/9/2024) 60 mL 4    fexofenadine (ALLEGRA) 180 MG tablet Take 1 tablet (180 mg) by mouth at bedtime 30 tablet 3    loratadine (CLARITIN) 10 MG tablet Take 10 mg by mouth daily as needed for allergies (Patient not taking: Reported on 7/9/2024)      mometasone (NASONEX) 50 MCG/ACT nasal spray Spray 2 sprays into both nostrils at bedtime 17 g 3     Current Facility-Administered Medications   Medication Dose Route Frequency Provider Last Rate Last Admin    lidocaine (XYLOCAINE) 2 % external gel   Urethral Once          Social History:  The patient works as a . Patient has the following hobbies or non-occupational exposure: N/A.    Family History:  Family History   Problem Relation Age of Onset    Hyperlipidemia Father     Crohn's Disease Sister     Coronary Artery Disease Early Onset Maternal Grandfather     No Known Problems  Paternal Grandmother     Lung Cancer Paternal Grandfather         non-smoking   Positive for seasonal allergies, but he has the most acute seasonal allergy symptoms.    Previous Labs, Allergy Tests, Dermatopathology, Imaging:  [Upon review of Epic chart, no prior allergist records as of 7/9/24.]    3/15/24 Dr. Alexis Montaño (aud)  FROM [facility]:  The patient reports longstanding difficulty hearing in background noise. He feels hearing is symmetrical between the ears. He also reports history of swimmers ear primarily on the right (last infection was 15 years ago), and PE tubes as a child. Occasional bilateral tinnitus. Reports family history of hearing loss in his mother- unsure age of onset. 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 He denies aural pain, pressure, drainage, dizziness.     FROM A&P:  Normal hearing bilaterally.   Patient was counseled on good communication strategies, hearing loss and impact on communication.  Handout on good communication strategies was given to patient. It is recommended that the patient return to monitor hearing if changes are noted or new ear symptoms arise     2/27/24 ROSA ISELA Lopez (derm)  FROM [facility]:  Concern 5:  - Acne  - Longstanding  - Patient has difficulty avoiding picking at lesions    FROM A&P:  # Excoriated acne  - The patient expresses awareness of the picking and an effort to stop.   - Start clindamycin 1% lotion twice daily     12/13/23 Dr. Darwin Park (FP)  FROM A&P:  Seasonal allergic rhinitis, unspecified trigger  Adult Allergy/Asthma  Referral    Hearing disorder, unspecified laterality   Adult Audiology  Referral    9/13/22 Dr. Anurag Valle (FP)  FROM [facility]:  Would like a referral for allergies and a referral to an audiologist    FROM A&P:  Seasonal allergic rhinitis, unspecified trigger  Comment: worse , so see  Plan: Adult Allergy/Asthma Referral, Adult ENT  Referral    Decreased hearing, unspecified  laterality  Comment: see audiology  Plan: Adult ENT  Referral    [Note in Referrals tab in Epic that referrals were faxed to [facility] and [facility].]    Referred By: Darwin Park DO (FP) [id number] [address]     Allergy Tests:  Past Allergy Test    Order for Future Allergy Testing:    [x] Outpatient  [] Inpatient: [facility]..../ Bed ....       Skin Atopy (atopic dermatitis) [x] Yes   [] No .........  Contact allergies:   [] Yes   [x] No ..........  Hand eczema:   [] Yes   [x] No           Leading hand:   [] R   [] L       [] Ambidextrous         Drug allergies:        [] Yes   [x] No  which?......    Urticaria/Angioedema  [] Yes   [x] No .........  Food Allergy:  [] Yes   [x] No  which?......  Pets :  [x] Yes   [] No  which?......         [x]  Rhinitis   [x] Conjunctivitis   [x] Sinusitis   [] Polyposis   [] Otitis   [] Pharyngitis         [x]  Postnasal drip    []  none  Operations:   [] Tonsils   [] Septum   [] Sinus   [] Polyposis        [] Asthma bronchiale   [x] Coughing      []  none  Symptoms (mostly rhinoconjunctivitis and asthma) aggravated by:  Season   [] I   [] II   [x] III   [x] IV   [x]V   []VI   []VII   []VIII   [x]IX   [x]X   []XI   []XII     [x] perennial   Day time      [] morning   [] noon      [] evening        [] night    [] whole day........  [x]  none  Location/changes    [] inside        [] outside   [] mountains    [] sea     [] others.............   [x]  none  Triggers, specific     [x] animals     [] plants     [] dust              [] others ...........................    []  none  Triggers, others       [] work          [] psyche    [] sport            [] others .............................  [x]  none  Irritant                [] phys efforts [] smoke    [] heat/cold     [] odors  []others............... [x]  none    Order for PATCH TESTS  Reason for tests (suspected allergy): not necessary at this time  Known previous allergies:  none  Standardized panels  [] Standard panel (40 tests)  [] Preservatives & Antimicrobials (31 tests)  [] Emulsifiers & Additives (25 tests)   [] Perfumes/Flavours & Plants (25 tests)  [] Hairdresser panel (12 tests)  [] Rubber Chemicals (22 tests)  [] Plastics (26 tests)  [] Colorants/Dyes/Food additives (20 tests)  [] Metals (implants/dental) (24 tests)  [] Local anaesthetics/NSAIDs (13 tests)  [] Antibiotics & Antimycotics (14 tests)   [] Corticosteroids (15 tests)   [] Photopatch test (62 tests)   [] Others:       [] Patient's Own Products:   DO NOT test if chemical or biological identity is unknown!   always ask from patient the product information and safety sheets (MSDS)       Order for PRICK TESTS    Reason for tests (suspected allergy): perennial rhinitis; seasonal aggravation in spring and fall with RC, RS, PND; clinical sensitization to dog epithelium (not to cats at home)  Known previous allergies: none    Standardized prick panels  [x] Atopic panel (20 tests)  [] Pediatric Panel (12 tests)  [] Milk, Meat, Eggs, Grains (20 tests)   [] Dust, Epithelia, Feathers (10 tests)  [] Fish, Seafood, Shellfish (17 tests)  [] Nuts, Beans (14 tests)  [] Spice, Vegetable, Fruit (17 tests)  [] Pollen Panel = Tree, Grass, Weed (24 tests)  [] Others:       [] Patient's Own Products:   DO NOT test if chemical or biological identity is unknown!   always ask from patient the product information and safety sheets (MSDS)     Standardized intradermal tests    [] Alternaria alternata  [] Aspergillus fumigatus  [] Cladosporium herbarum   [] Penicillium notatum  [] Dermatophagoides farinae  [] Dermatophagoides pteronyssinus  [] Dog Epithelium  [] Cat Epithelium  [] Others:     [] Bee venom   [] Wasp venom  !!Specific protocol with dilutions!!       Order for Drug allergy tests (prick & intradermal & patch tests)    [] Penicillin G  [] Ampicillin [] Cefazolin   [] Ceftriaxone   [] Ceftazidime  [] Bactrim    [] Others:   Order for  ... as test date      Atopy Screen (Placed Jul 9, 2024)  7/9/24: Patient took a cetirizine this AM and positive control was negative. Will perform at a future appointment after patient has held all antihistamines x 5-7 days.  No Substance Readings (15 min) Evaluation   POS Histamine 1mg/ml ++    NEG NaCl 0.9% - dermographism     No Substance Readings (15 min) Evaluation   1 Alternaria alternata (tenuis)  +    2 Cladosporium herbarum -    3 Aspergillus fumigatus -    4 Penicillium notatum -    5 Dermatophagoides pteronyssinus +++    6 Dermatophagoides farinae ++    7 Dog epithelium (canis spp) -    8 Cat hair (elizabeth catus) +    9 Cockroach   (Blatella americana & germanica) -    10 Grass mix midwest   (Sarai, Orchard, Redtop, Rosendo) -    11 Otilio grass (sorghum halepense) -    12 Weed mix   (common Cocklebur, Lamb s quarters, rough redroot Pigweed, Dock/Sorrel) -    13 Mug wort (artemisia vulgare) -    14 Ragweed giant/short (ambrosia spp) -    15 White birch (Betula papyrifera) -    16 Tree mix 1 (Pecan, Maple BHR, Oak RVW, american Brick, black Har[place]) -    17 Red cedar (juniperus virginia) -    18 Tree mix 2   (white Saw, river/red Birch, black [place], common [place], american Elm) -    19 Box elder/Maple mix (acer spp) -    20 Teasdale shagbark (carya ovata) -    Conclusion: Informational handout for HDM reduction provided.      Assessment & Plan:    ==> Final Diagnosis:     # Atopic predisposition with:  Perennial rhinitis with proven sensitization to dust mites  Seasonal aggravation in spring and fall with RC, RS, PND --> proven sensitization to snow mold Alternaria  Rhinitis around dogs (not to cats at home) --> prick test negative for dog, but positive for cat  * chronic illness with exacerbation, progression, side effects from treatment    These conclusions are made at the best of one's knowledge and belief based on the provided evidence such as patient's history and allergy test results and they can  change over time or can be incomplete because of missing information.    ==> Treatment Plan:    >> For HDM allergy:  - Continue with house dust mite reduction as previously discussed. Encouraged mattress and pillow encasements.   - If he chooses to take OTC antihistamine and/or use OTC nasal spray, recommend he take/use in the evening for about 1 month when symptoms are more active.  - Future Considerations if symptoms flare and are uncontrolled: Odactra (informational handout previously provided on 8/6/24).    Procedures Performed: None    Staff and Scribe: provider    Scribe Disclosure:   I, DARIAN YUSUF, am serving as a scribe to document services personally performed by Ethan Brunson MD based on data collection and the provider's statements to me.     Staff Physician Comments:  I was present with the scribe who participated in the documentation of the note. I have verified the history and personally performed the physical exam and medical decision making. I agree with the assessment and plan as documented in the note. I have reviewed and if necessary amended the note.      Ethan Brunson MD  Professor  Head of Dermato-Allergy Division  Department of Dermatology  [facility]     Follow-up in Derm-Allergy clinic PRN  - next scheduled to see referring provider (PCP) on 12/13/24  ___________________________    I spent a total of 18 minutes with Craig Gusman during today s visit. This time was spent discussing all the individual test results, correlating them to the clinical relevance, counseling the patient and/or coordinating care.

## 2024-10-30 NOTE — NURSING NOTE
Chief Complaint   Patient presents with    Allergy Testing Followup     When leaves started falling had sinus congestion, cetrizine didn't help, stopped after a week, hasn't started nasal spray, minimally using HDM reduction measures. Other than that week, hasn't really had symptoms.      Sher Torres RN

## 2024-11-13 ENCOUNTER — VIRTUAL VISIT (OUTPATIENT)
Dept: PSYCHOLOGY | Facility: CLINIC | Age: 34
End: 2024-11-13
Payer: COMMERCIAL

## 2024-11-13 DIAGNOSIS — F32.0 CURRENT MILD EPISODE OF MAJOR DEPRESSIVE DISORDER, UNSPECIFIED WHETHER RECURRENT (H): Primary | ICD-10-CM

## 2024-11-13 PROCEDURE — 90834 PSYTX W PT 45 MINUTES: CPT | Mod: 95 | Performed by: STUDENT IN AN ORGANIZED HEALTH CARE EDUCATION/TRAINING PROGRAM

## 2024-11-13 NOTE — PROGRESS NOTES
[facility]                                     Progress Note    Patient Name: Craig Gumsan  Date: 11/13/2024         Service Type: Individual      Session Start Time: 12.00  Session End Time: 12.51     Session Length: 38-52     Session #: 10    Attendees: Client attended alone    Service Modality:  Video Visit:      Provider verified identity through the following two step process.  Patient provided:  Patient is known previously to provider    Telemedicine Visit: The patient's condition can be safely assessed and treated via synchronous audio and visual telemedicine encounter.      Reason for Telemedicine Visit: Patient has requested telehealth visit    Originating Site (Patient Location): Patient's home    Distant Site (Provider Location): Provider Remote Setting- Home Office    Consent:  The patient/guardian has verbally consented to: the potential risks and benefits of telemedicine (video visit) versus in person care; bill my insurance or make self-payment for services provided; and responsibility for payment of non-covered services.     Patient would like the video invitation sent by:  My Chart    Mode of Communication:  Video Conference via Amwell    Distant Location (Provider):  Off-site    As the provider I attest to compliance with applicable laws and regulations related to telemedicine.    DATA  Interactive Complexity: No  Crisis: No        Progress Since Last Session (Related to Symptoms / Goals / Homework):   Symptoms: Improving patient reported an improvement in mood since last session    Homework: Completed in session review patient's  application of behavior activation strategies and positively reinforced observable changes.      Episode of Care Goals: Satisfactory progress - MAINTENANCE (Working to maintain change, with risk of relapse); Intervened by continuing to positively reinforce healthy behavior choice      Current / Ongoing Stressors and Concerns:        Current:  "Today, patient reported an improvement in mood since last session. He noted \"feeling great\" about his work and that he just had his six months review and the report was positive. He noted his wife has recovered from the miscarriage she had and their primary care provider has recommended they can start trying to have another pregnancy. He reported he has been sad, and disappointed by the recent election results and struggles with heavy emotions.        Treatment Objective(s) Addressed in This Session:     Patient will increase daily activity level and  participate in at least 1 daily pleasant/fun activities.  Patient will practice healthy sleep habits daily.   Practice mindfulness exercises daily  for 15-30 minutes.      Intervention:     DBT- Mindfulness:  Normalized current emotional state and work today with patient using Armida Echavarria's RAIN mindfulness (Recognized, Acknowledged, Investigate and Non-judgmental) tool to help them regulate current distressful feelings. Discussed the process beginning with recognizing when the emotion is present by pausing and asking yourself,  What am I experiencing right now in my body, thoughts, emotions, and situation?  then acknowledge and allow what is already here to be here. Investigate the  internal experience with curiosity and compassion and observe current  feelings not as an experience  unique to you but rather as an emotion that is experience by others.        ASSESSMENT: Current Emotional / Mental Status (status of significant symptoms):   Risk status (Self / Other harm or suicidal ideation)   Patient denies current fears or concerns for personal safety.   Patient denies current or recent suicidal ideation or behaviors.   Patient denies current or recent homicidal ideation or behaviors.   Patient denies current or recent self injurious behavior or ideation.   Patient denies other safety concerns.   Patient reports there has been no change in risk factors since " their last session.     Patient reports there has been no change in protective factors since their last session.     Recommended that patient call 911 or go to the local ED should there be a change in any of these risk factors.     Appearance:   Appropriate    Eye Contact:   Good    Psychomotor Behavior: Normal    Attitude:   Cooperative  Interested   Orientation:   All   Speech    Rate / Production: Normal/ Responsive    Volume:  Normal    Mood:    Depressed    Affect:    Appropriate    Thought Content:  Clear    Thought Form:  Coherent  Goal Directed    Insight:    Good      Medication Review:   No changes to current psychiatric medication(s)     Medication Compliance:   Yes     Changes in Health Issues:   None reported     Chemical Use Review:   Substance Use: Chemical use reviewed, no active concerns identified      Tobacco Use: No change in amount of tobacco use since last session.  Patient assessed present costs and future losses as a result of smoking    Diagnosis:  296.35 (F33.41)  Major Depressive Disorder, Recurrent Episode, In partial remission _ and With atypical features    Collateral Reports Completed:   Not Applicable    PLAN: (Patient Tasks / Therapist Tasks / Other)    Use sleep hygiene tips discussed in session daily.     Explore and attend support groups for couples with a history of miscarriages.     Practice mindfulness skills learned in session daily to regulate distressful feelings.         Darrel Yoder, Maine Medical CenterSW            ______________________________________________________________________    Individual Treatment Plan    Patient's Name: Craig Gusman  YOB: 1990    Date of Creation: 04/03/2024  Date Treatment Plan Last Reviewed/Revised: 11/13/2024    DSM5 Diagnoses: 296.35 (F33.41)  Major Depressive Disorder, Recurrent Episode, In partial remission _ and With atypical features  Psychosocial / Contextual Factors: unemployment  PROMIS (reviewed every 90 days):     Referral /  "Collaboration:  Referral to another professional/service is not indicated at this time..    Anticipated number of session for this episode of care:  15-25  Anticipation frequency of session: Biweekly  Anticipated Duration of each session: 38-52 minutes  Treatment plan will be reviewed in 90 days or when goals have been changed.       MeasurableTreatment Goal(s) related to diagnosis / functional impairment(s)    Goal 1: Patient will identify and address specific triggers to feelings of depression and improve coping by  90 % in the next three months.      I will know I've met my goal when I am no longer in the [place] that I am in right now and less depressed and not putting a lot of mental efforts into basic activities\"     Objective #A (Patient Action)    Patient will increase daily activity level by exercising for 20-30 minutes and participate in at least 1 daily pleasant/fun activities.  Status: Continued - Date(s): 02/14/2025    Intervention(s)  Therapist will provide psychoeducation, behavioral activation, and cognitive restructuring.    Objective #B  Patient will identify specific areas of cognitive distortion and challenge irrational thoughts with reality.   Status: Continued - Date(s):02/14/2025       Intervention(s)  Therapist will provide psychoeducation, behavioral activation, and cognitive restructuring.    Objective #A (Patient Action)    Patient will maintain a regular sleep and wake pattern daily.  Status: Continued - Date(s):02/14/2025    Intervention(s)  Therapist will provide psychoeducation, behavioral activation, and cognitive restructuring.    Objective #C  Patient will learn and practice relaxation techniques to manage depression.  Status: Continued - Date(s):  02/14/2025      Intervention(s)  Therapist will provide psychoeducation, behavioral activation, and cognitive restructuring.      Patient has reviewed and agreed to the above plan.      NATHAN FloydSW  April 3, 2024    Answers submitted " by the patient for this visit:  MALVIN-7 (Submitted on 3/27/2024)  MALVIN 7 TOTAL SCORE: 12

## 2025-01-14 ENCOUNTER — VIRTUAL VISIT (OUTPATIENT)
Dept: PSYCHOLOGY | Facility: CLINIC | Age: 35
End: 2025-01-14
Payer: COMMERCIAL

## 2025-01-14 DIAGNOSIS — F32.0 CURRENT MILD EPISODE OF MAJOR DEPRESSIVE DISORDER, UNSPECIFIED WHETHER RECURRENT: Primary | ICD-10-CM

## 2025-01-14 PROCEDURE — 90834 PSYTX W PT 45 MINUTES: CPT | Mod: 95 | Performed by: STUDENT IN AN ORGANIZED HEALTH CARE EDUCATION/TRAINING PROGRAM

## 2025-01-14 NOTE — PROGRESS NOTES
[facility]                                     Progress Note    Patient Name: Craig Gusman  Date: 01/14/2025         Service Type: Individual      Session Start Time: 11.00  Session End Time: 11.43     Session Length: 38-52     Session #: 11    Attendees: Client attended alone    Service Modality:  Video Visit:      Provider verified identity through the following two step process.  Patient provided:  Patient is known previously to provider    Telemedicine Visit: The patient's condition can be safely assessed and treated via synchronous audio and visual telemedicine encounter.      Reason for Telemedicine Visit: Patient has requested telehealth visit    Originating Site (Patient Location): Patient's home    Distant Site (Provider Location): Provider Remote Setting- Home Office    Consent:  The patient/guardian has verbally consented to: the potential risks and benefits of telemedicine (video visit) versus in person care; bill my insurance or make self-payment for services provided; and responsibility for payment of non-covered services.     Patient would like the video invitation sent by:  My Chart    Mode of Communication:  Video Conference via Amwell    Distant Location (Provider):  Off-site    As the provider I attest to compliance with applicable laws and regulations related to telemedicine.    DATA  Interactive Complexity: No  Crisis: No        Progress Since Last Session (Related to Symptoms / Goals / Homework):   Symptoms: Improving patient reported an improvement in mood since last session    Homework: Completed in session review patient's  application of behavior activation strategies and positively reinforced observable changes.      Episode of Care Goals: Satisfactory progress - MAINTENANCE (Working to maintain change, with risk of relapse); Intervened by continuing to positively reinforce healthy behavior choice      Current / Ongoing Stressors and Concerns:        Current:  "Today, patient reported an improvement in mood since last session. He reported that work has been good and that \"he and his wife are just homebodies\" and trying to explore new things to do together out of the house. He noted that he has always set a new year resolution on something to improve in his life, and that he is trying to be flexible this year.      Treatment Objective(s) Addressed in This Session:     Patient will increase daily activity level and  participate in at least 1 daily pleasant/fun activities.  Patient will practice healthy sleep habits daily.   Practice mindfulness exercises daily  for 15-30 minutes.      Intervention:    MI Intervention: Expressed Empathy/Understanding, Supported Autonomy, Collaboration, Evocation, Permission to raise concern or advise, Open-ended questions, and Reflections: simple and complex Explore different ways to mitigate boredom like going to a movie and finding out what movie is on and paying the ticket then.        ASSESSMENT: Current Emotional / Mental Status (status of significant symptoms):   Risk status (Self / Other harm or suicidal ideation)   Patient denies current fears or concerns for personal safety.   Patient denies current or recent suicidal ideation or behaviors.   Patient denies current or recent homicidal ideation or behaviors.   Patient denies current or recent self injurious behavior or ideation.   Patient denies other safety concerns.   Patient reports there has been no change in risk factors since their last session.     Patient reports there has been no change in protective factors since their last session.     Recommended that patient call 911 or go to the local ED should there be a change in any of these risk factors.     Appearance:   Appropriate    Eye Contact:   Good    Psychomotor Behavior: Normal    Attitude:   Cooperative  Interested   Orientation:   All   Speech    Rate / Production: Normal/ Responsive    Volume:  Normal "    Mood:    Euthymic   Affect:    Appropriate    Thought Content:  Clear    Thought Form:  Coherent  Goal Directed    Insight:    Good      Medication Review:   No changes to current psychiatric medication(s)     Medication Compliance:   Yes     Changes in Health Issues:   None reported     Chemical Use Review:   Substance Use: Chemical use reviewed, no active concerns identified      Tobacco Use: No change in amount of tobacco use since last session.  Patient assessed present costs and future losses as a result of smoking    Diagnosis:  296.35 (F33.41)  Major Depressive Disorder, Recurrent Episode, In partial remission _ and With atypical features    Collateral Reports Completed:   Not Applicable    PLAN: (Patient Tasks / Therapist Tasks / Other)    Use sleep hygiene tips discussed in session daily.     Explore and attend support groups for couples with a history of miscarriages.     Practice mindfulness skills  daily to regulate distressful feelings.         ALEX Floyd            ______________________________________________________________________    Individual Treatment Plan    Patient's Name: Craig Gusman  YOB: 1990    Date of Creation: 04/03/2024  Date Treatment Plan Last Reviewed/Revised: 11/13/2024    DSM5 Diagnoses: 296.35 (F33.41)  Major Depressive Disorder, Recurrent Episode, In partial remission _ and With atypical features  Psychosocial / Contextual Factors: unemployment  PROMIS (reviewed every 90 days):     Referral / Collaboration:  Referral to another professional/service is not indicated at this time..    Anticipated number of session for this episode of care:  15-25  Anticipation frequency of session: Biweekly  Anticipated Duration of each session: 38-52 minutes  Treatment plan will be reviewed in 90 days or when goals have been changed.       MeasurableTreatment Goal(s) related to diagnosis / functional impairment(s)    Goal 1: Patient will identify and address specific  "triggers to feelings of depression and improve coping by  90 % in the next three months.      I will know I've met my goal when I am no longer in the [place] that I am in right now and less depressed and not putting a lot of mental efforts into basic activities\"     Objective #A (Patient Action)    Patient will increase daily activity level by exercising for 20-30 minutes and participate in at least 1 daily pleasant/fun activities.  Status: Continued - Date(s): 02/14/2025    Intervention(s)  Therapist will provide psychoeducation, behavioral activation, and cognitive restructuring.    Objective #B  Patient will identify specific areas of cognitive distortion and challenge irrational thoughts with reality.   Status: Continued - Date(s):02/14/2025       Intervention(s)  Therapist will provide psychoeducation, behavioral activation, and cognitive restructuring.    Objective #A (Patient Action)    Patient will maintain a regular sleep and wake pattern daily.  Status: Continued - Date(s):02/14/2025    Intervention(s)  Therapist will provide psychoeducation, behavioral activation, and cognitive restructuring.    Objective #C  Patient will learn and practice relaxation techniques to manage depression.  Status: Continued - Date(s):  02/14/2025      Intervention(s)  Therapist will provide psychoeducation, behavioral activation, and cognitive restructuring.      Patient has reviewed and agreed to the above plan.      ALEX Floyd  April 3, 2024    Answers submitted by the patient for this visit:  MALVIN-7 (Submitted on 3/27/2024)  MALVIN 7 TOTAL SCORE: 12    "

## 2025-02-01 DIAGNOSIS — Z31.41 FERTILITY TESTING: Primary | ICD-10-CM

## 2025-03-19 ENCOUNTER — VIRTUAL VISIT (OUTPATIENT)
Dept: PSYCHOLOGY | Facility: CLINIC | Age: 35
End: 2025-03-19
Payer: COMMERCIAL

## 2025-03-19 DIAGNOSIS — F32.0 CURRENT MILD EPISODE OF MAJOR DEPRESSIVE DISORDER, UNSPECIFIED WHETHER RECURRENT: Primary | ICD-10-CM

## 2025-03-19 PROCEDURE — 90834 PSYTX W PT 45 MINUTES: CPT | Mod: 95 | Performed by: STUDENT IN AN ORGANIZED HEALTH CARE EDUCATION/TRAINING PROGRAM

## 2025-03-19 NOTE — PROGRESS NOTES
[facility]                                     Progress Note    Patient Name: Craig Gusman  Date: 03/19/2025         Service Type: Individual      Session Start Time: 12.00  Session End Time: 12.43     Session Length: 38-52     Session #: 12    Attendees: Client attended alone    Service Modality:  Video Visit:      Provider verified identity through the following two step process.  Patient provided:  Patient is known previously to provider    Telemedicine Visit: The patient's condition can be safely assessed and treated via synchronous audio and visual telemedicine encounter.      Reason for Telemedicine Visit: Patient has requested telehealth visit    Originating Site (Patient Location): Patient's home    Distant Site (Provider Location): Provider Remote Setting- Home Office    Consent:  The patient/guardian has verbally consented to: the potential risks and benefits of telemedicine (video visit) versus in person care; bill my insurance or make self-payment for services provided; and responsibility for payment of non-covered services.     Patient would like the video invitation sent by:  My Chart    Mode of Communication:  Video Conference via Amwell    Distant Location (Provider):  Off-site    As the provider I attest to compliance with applicable laws and regulations related to telemedicine.    DATA  Interactive Complexity: No  Crisis: No        Progress Since Last Session (Related to Symptoms / Goals / Homework):   Symptoms: Improving patient reported an improvement in mood since last session    Homework: Completed in session review patient's  application of behavior activation strategies and positively reinforced observable changes.      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      Treatment Objective(s) Addressed in This Session:     Patient will increase daily activity level and  participate in at least 1 daily pleasant/fun activities.  Patient will practice healthy sleep habits daily.   Practice mindfulness exercises daily  for 15-30 minutes.      Intervention:  CBT: Work with patient today using a  time traveler's log  mindfulness tool to help them to consciously notice the temporal location of their thoughts and bring their attention back to the here and now. Couched patient that fusion with distressing thoughts often involved a form of psychological time travel between the past and future leading to temporal displacement and distressing emotions. Model process in session by asking patient to tune into their mind and notice their thoughts and feelings and whether the thoughts are in the past, future, or present. Provider guided patient through an in-session exercise to observe their thoughts, notice where there are temporarily present and shift their attention to the here and now using their breath as an anchor to the present.          ASSESSMENT: Current Emotional / Mental Status (status of significant symptoms):   Risk status (Self / Other harm or suicidal ideation)   Patient denies current fears or concerns for personal safety.   Patient denies current or recent suicidal ideation or behaviors.   Patient denies current or recent homicidal ideation or behaviors.   Patient denies current or recent self injurious behavior or ideation.   Patient denies other safety " concerns.   Patient reports there has been no change in risk factors since their last session.     Patient reports there has been no change in protective factors since their last session.     Recommended that patient call 911 or go to the local ED should there be a change in any of these risk factors.     Appearance:   Appropriate    Eye Contact:   Good    Psychomotor Behavior: Normal    Attitude:   Cooperative  Interested   Orientation:   All   Speech    Rate / Production: Normal/ Responsive    Volume:  Normal    Mood:    Anxious    Affect:    Appropriate    Thought Content:  Clear    Thought Form:  Coherent  Goal Directed    Insight:    Good      Medication Review:   No changes to current psychiatric medication(s)     Medication Compliance:   Yes     Changes in Health Issues:   None reported     Chemical Use Review:   Substance Use: Chemical use reviewed, no active concerns identified      Tobacco Use: No change in amount of tobacco use since last session.  Patient assessed present costs and future losses as a result of smoking    Diagnosis:  296.35 (F33.41)  Major Depressive Disorder, Recurrent Episode, In partial remission _ and With atypical features    Collateral Reports Completed:   Not Applicable    PLAN: (Patient Tasks / Therapist Tasks / Other)    Use sleep hygiene tips discussed in session daily.     Explore and attend support groups for couples with a history of miscarriages.     Practice mindfulness skills  daily to regulate distressful feelings.         ALEX Floyd            ______________________________________________________________________    Individual Treatment Plan    Patient's Name: Craig Gusman  YOB: 1990    Date of Creation: 04/03/2024  Date Treatment Plan Last Reviewed/Revised: 03/19/2025    DSM5 Diagnoses: 296.35 (F33.41)  Major Depressive Disorder, Recurrent Episode, In partial remission _ and With atypical features  Psychosocial / Contextual Factors:  "unemployment  PROMIS (reviewed every 90 days):     Referral / Collaboration:  Referral to another professional/service is not indicated at this time..    Anticipated number of session for this episode of care:  15-25  Anticipation frequency of session: Biweekly  Anticipated Duration of each session: 38-52 minutes  Treatment plan will be reviewed in 90 days or when goals have been changed.       MeasurableTreatment Goal(s) related to diagnosis / functional impairment(s)    Goal 1: Patient will identify and address specific triggers to feelings of depression and improve coping by  90 % in the next three months.      I will know I've met my goal when I am no longer in the [place] that I am in right now and less depressed and not putting a lot of mental efforts into basic activities\"     Objective #A (Patient Action)    Patient will increase daily activity level by exercising for 20-30 minutes and participate in at least 1 daily pleasant/fun activities.  Status: Continued - Date(s): 06/20/2025    Intervention(s)  Therapist will provide psychoeducation, behavioral activation, and cognitive restructuring.    Objective #B  Patient will identify specific areas of cognitive distortion and challenge irrational thoughts with reality.   Status: Continued - Date(s):06/20/2025       Intervention(s)  Therapist will provide psychoeducation, behavioral activation, and cognitive restructuring.    Objective #A (Patient Action)    Patient will maintain a regular sleep and wake pattern daily.  Status: Continued - Date(s):06/20/2025    Intervention(s)  Therapist will provide psychoeducation, behavioral activation, and cognitive restructuring.    Objective #C  Patient will learn and practice relaxation techniques to manage depression.  Status: Continued - Date(s):  06/20/2025      Intervention(s)  Therapist will provide psychoeducation, behavioral activation, and cognitive restructuring.      Patient has reviewed and agreed to the above " plan.      Darrel Yoder LICSW  April 3, 2024    Answers submitted by the patient for this visit:  MALVIN-7 (Submitted on 3/27/2024)  MALVIN 7 TOTAL SCORE: 12

## 2025-04-16 ENCOUNTER — VIRTUAL VISIT (OUTPATIENT)
Dept: PSYCHOLOGY | Facility: CLINIC | Age: 35
End: 2025-04-16
Payer: COMMERCIAL

## 2025-04-16 DIAGNOSIS — F32.0 CURRENT MILD EPISODE OF MAJOR DEPRESSIVE DISORDER, UNSPECIFIED WHETHER RECURRENT: Primary | ICD-10-CM

## 2025-04-16 PROCEDURE — 90834 PSYTX W PT 45 MINUTES: CPT | Mod: 95 | Performed by: STUDENT IN AN ORGANIZED HEALTH CARE EDUCATION/TRAINING PROGRAM

## 2025-04-16 NOTE — PROGRESS NOTES
[facility]                                     Progress Note    Patient Name: Craig Gusman  Date: 04/16/2025         Service Type: Individual      Session Start Time: 12.00  Session End Time: 12.41     Session Length: 38-52     Session #: 13    Attendees: Client attended alone    Service Modality:  Video Visit:      Provider verified identity through the following two step process.  Patient provided:  Patient is known previously to provider    Telemedicine Visit: The patient's condition can be safely assessed and treated via synchronous audio and visual telemedicine encounter.      Reason for Telemedicine Visit: Patient has requested telehealth visit    Originating Site (Patient Location): Patient's home    Distant Site (Provider Location): Provider Remote Setting- Home Office    Consent:  The patient/guardian has verbally consented to: the potential risks and benefits of telemedicine (video visit) versus in person care; bill my insurance or make self-payment for services provided; and responsibility for payment of non-covered services.     Patient would like the video invitation sent by:  My Chart    Mode of Communication:  Video Conference via Amwell    Distant Location (Provider):  Off-site    As the provider I attest to compliance with applicable laws and regulations related to telemedicine.    DATA  Interactive Complexity: No  Crisis: No        Progress Since Last Session (Related to Symptoms / Goals / Homework):   Symptoms: Improving patient reported an improvement in mood since last session    Homework: Achieved / completed to satisfaction       Episode of Care Goals: Satisfactory progress - MAINTENANCE (Working to maintain change, with risk of relapse); Intervened by continuing to positively reinforce healthy behavior choice      Current / Ongoing Stressors and Concerns:        Current: Today, patient reported an improvement in mood. He noted he  is enjoying his work and feel  "supported by his manager in a way he hasn't in previous jobs.  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    Treatment Objective(s) Addressed in This Session:     Patient will increase daily activity level and  participate in at least 1 daily pleasant/fun activities.  Patient will identify specific areas of cognitive distortion and challenge irrational thoughts with reality.      Intervention:        CBT: Reviewed thought-feeling-action connection today and focus session on mental filters as an unhelpful thinking style where we focus on one aspect and exclude other details of the whole picture,. Model process in session  and couched patient  to ask themselves \"what am I  focusing on here and what am I ignoring?\"  What was I saying to myself?   What was going through my head at the time?    Reviewed cognitive distortions and helpful versus unhelpful responses.  Discussed techniques to challenge the belief about the event by disputing the validity of the belief. Handout Provided.         ASSESSMENT: Current Emotional / Mental Status (status of significant symptoms):   Risk status (Self / Other harm or suicidal ideation)   Patient denies current fears or concerns for personal safety.   Patient denies current or recent suicidal ideation or behaviors.   Patient denies current or recent homicidal ideation or behaviors.   Patient denies current or recent self injurious behavior or ideation.   Patient denies other safety concerns.   Patient reports there has been no change in risk factors since their last session.     Patient reports there has been no change in protective factors since their last session.     Recommended that patient call 911 or go to the local ED should there be a change in any of these risk factors.     Appearance:   Appropriate    Eye Contact:   Good    Psychomotor Behavior: Normal " "   Attitude:   Cooperative  Interested   Orientation:   All   Speech    Rate / Production: Normal/ Responsive    Volume:  Normal    Mood:    Euthymic   Affect:    Appropriate    Thought Content:  Clear    Thought Form:  Coherent  Goal Directed    Insight:    Good      Medication Review:   No changes to current psychiatric medication(s)     Medication Compliance:   Yes     Changes in Health Issues:   None reported     Chemical Use Review:   Substance Use: Chemical use reviewed, no active concerns identified      Tobacco Use: No change in amount of tobacco use since last session.  Patient assessed present costs and future losses as a result of smoking    Diagnosis:  296.35 (F33.41)  Major Depressive Disorder, Recurrent Episode, In partial remission _ and With atypical features    Collateral Reports Completed:   Not Applicable    PLAN: (Patient Tasks / Therapist Tasks / Other)    Explore and attend support groups for couples with a history of miscarriages.     Work on provided handout on \"mental filter.\" Identify situation and circumstances, process experience next session.         ALEX Floyd            ______________________________________________________________________    Individual Treatment Plan    Patient's Name: Craig Gusman  YOB: 1990    Date of Creation: 04/03/2024  Date Treatment Plan Last Reviewed/Revised: 03/19/2025    DSM5 Diagnoses: 296.35 (F33.41)  Major Depressive Disorder, Recurrent Episode, In partial remission _ and With atypical features  Psychosocial / Contextual Factors: unemployment  PROMIS (reviewed every 90 days):     Referral / Collaboration:  Referral to another professional/service is not indicated at this time..    Anticipated number of session for this episode of care:  15-25  Anticipation frequency of session: Biweekly  Anticipated Duration of each session: 38-52 minutes  Treatment plan will be reviewed in 90 days or when goals have been changed. " "      MeasurableTreatment Goal(s) related to diagnosis / functional impairment(s)    Goal 1: Patient will identify and address specific triggers to feelings of depression and improve coping by  90 % in the next three months.      I will know I've met my goal when I am no longer in the [place] that I am in right now and less depressed and not putting a lot of mental efforts into basic activities\"     Objective #A (Patient Action)    Patient will increase daily activity level by exercising for 20-30 minutes and participate in at least 1 daily pleasant/fun activities.  Status: Continued - Date(s): 06/20/2025    Intervention(s)  Therapist will provide psychoeducation, behavioral activation, and cognitive restructuring.    Objective #B  Patient will identify specific areas of cognitive distortion and challenge irrational thoughts with reality.   Status: Continued - Date(s):06/20/2025       Intervention(s)  Therapist will provide psychoeducation, behavioral activation, and cognitive restructuring.    Objective #A (Patient Action)    Patient will maintain a regular sleep and wake pattern daily.  Status: Continued - Date(s):06/20/2025    Intervention(s)  Therapist will provide psychoeducation, behavioral activation, and cognitive restructuring.    Objective #C  Patient will learn and practice relaxation techniques to manage depression.  Status: Continued - Date(s):  06/20/2025      Intervention(s)  Therapist will provide psychoeducation, behavioral activation, and cognitive restructuring.      Patient has reviewed and agreed to the above plan.      ALEX Floyd  April 3, 2024    Answers submitted by the patient for this visit:  MALVIN-7 (Submitted on 3/27/2024)  MALVIN 7 TOTAL SCORE: 12      "

## 2025-06-25 ENCOUNTER — VIRTUAL VISIT (OUTPATIENT)
Dept: PSYCHOLOGY | Facility: CLINIC | Age: 35
End: 2025-06-25
Payer: COMMERCIAL

## 2025-06-25 DIAGNOSIS — F32.0 CURRENT MILD EPISODE OF MAJOR DEPRESSIVE DISORDER, UNSPECIFIED WHETHER RECURRENT: Primary | ICD-10-CM

## 2025-06-25 PROCEDURE — 90834 PSYTX W PT 45 MINUTES: CPT | Mod: 95 | Performed by: STUDENT IN AN ORGANIZED HEALTH CARE EDUCATION/TRAINING PROGRAM

## 2025-06-25 NOTE — PROGRESS NOTES
[facility]                                     Progress Note    Patient Name: Craig Gusman  Date: 06/25/2025         Service Type: Individual      Session Start Time: 12.00  Session End Time: 12.41     Session Length: 38-52     Session #: 14    Attendees: Client attended alone    Service Modality:  Video Visit:      Provider verified identity through the following two step process.  Patient provided:  Patient is known previously to provider    Telemedicine Visit: The patient's condition can be safely assessed and treated via synchronous audio and visual telemedicine encounter.      Reason for Telemedicine Visit: Patient has requested telehealth visit    Originating Site (Patient Location): Patient's home    Distant Site (Provider Location): Provider Remote Setting- Home Office    Consent:  The patient/guardian has verbally consented to: the potential risks and benefits of telemedicine (video visit) versus in person care; bill my insurance or make self-payment for services provided; and responsibility for payment of non-covered services.     Patient would like the video invitation sent by:  My Chart    Mode of Communication:  Video Conference via [employer]    Distant Location (Provider):  Off-site    As the provider I attest to compliance with applicable laws and regulations related to telemedicine.    DATA  Interactive Complexity: No  Crisis: No        Progress Since Last Session (Related to Symptoms / Goals / Homework):   Symptoms: Improving patient reported an improvement in mood since last session    Homework: Achieved / completed to satisfaction       Episode of Care Goals: Satisfactory progress - MAINTENANCE (Working to maintain change, with risk of relapse); Intervened by continuing to positively reinforce healthy behavior choice      Current / Ongoing Stressors and Concerns:        Current: Today, patient reported an improvement in mood. Noted his wife is pregnant again and two week  post pregnancy, feels they have move past the stage where he is anxious about keeping the pregnancy as he thinks about the past miscarriage. Work has been stressful as he is doing more now than before following a couple of layoffs since he works with the [employer]. Reported he love his job but frustrated with the current government policies as it is adversely impacting his work structure.     Treatment Objective(s) Addressed in This Session:     Patient will increase daily activity level and  participate in at least 1 daily pleasant/fun activities.  Patient will identify specific areas of cognitive distortion and challenge irrational thoughts with reality.      Intervention:                        Normalized current emotional state and explore ways to positively adapt to what we can't change. Focus session on ways patient can build resilience.  Identify the following resilient   strategies and encourage patient to practice them:  Be optimistic and use mindfulness to shift your attention from negative rumination to more positive thoughts about the future.  Accept that change is part of living and cope effectively rather than avoid loss or pain.  Nurture positive relationships and supportive connections within and outside your family.  Find something meaningful in the current challenging experience.       ASSESSMENT: Current Emotional / Mental Status (status of significant symptoms):   Risk status (Self / Other harm or suicidal ideation)   Patient denies current fears or concerns for personal safety.   Patient denies current or recent suicidal ideation or behaviors.   Patient denies current or recent homicidal ideation or behaviors.   Patient denies current or recent self injurious behavior or ideation.   Patient denies other safety concerns.   Patient reports there has been no change in risk factors since their last session.     Patient reports there has been no change in protective factors since their last  session.     Recommended that patient call 911 or go to the local ED should there be a change in any of these risk factors.     Appearance:   Appropriate    Eye Contact:   Good    Psychomotor Behavior: Normal    Attitude:   Cooperative  Interested   Orientation:   All   Speech    Rate / Production: Normal/ Responsive    Volume:  Normal    Mood:    Euthymic   Affect:    Appropriate    Thought Content:  Clear    Thought Form:  Coherent  Goal Directed    Insight:    Good      Medication Review:   No changes to current psychiatric medication(s)     Medication Compliance:   Yes     Changes in Health Issues:   None reported     Chemical Use Review:   Substance Use: Chemical use reviewed, no active concerns identified      Tobacco Use: No change in amount of tobacco use since last session.  Patient assessed present costs and future losses as a result of smoking    Diagnosis:  296.35 (F33.41)  Major Depressive Disorder, Recurrent Episode, In partial remission _ and With atypical features    Collateral Reports Completed:   Not Applicable    PLAN: (Patient Tasks / Therapist Tasks / Other)    Practice mindfulness skill daily, diaphragmatic  breathing and 5-4-3-2-1.        Darrel Yoder [facility]    ______________________________________________________________________    Individual Treatment Plan    Patient's Name: Craig Gusman  YOB: 1990    Date of Creation: 04/03/2024  Date Treatment Plan Last Reviewed/Revised: 06/25/2025    DSM5 Diagnoses: 296.35 (F33.41)  Major Depressive Disorder, Recurrent Episode, In partial remission _ and With atypical features  Psychosocial / Contextual Factors: unemployment  PROMIS (reviewed every 90 days):     Referral / Collaboration:  Referral to another professional/service is not indicated at this time..    Anticipated number of session for this episode of care: 15-25  Anticipation frequency of session: Biweekly  Anticipated Duration of each session: 38-52 minutes  Treatment plan will  "be reviewed in 90 days or when goals have been changed.       MeasurableTreatment Goal(s) related to diagnosis / functional impairment(s)    Goal 1: Patient will identify and address specific triggers to feelings of depression and improve coping by  90 % in the next three months.      I will know I've met my goal when I am no longer in the [place] that I am in right now and less depressed and not putting a lot of mental efforts into basic activities\"     Objective #A (Patient Action)    Patient will increase daily activity level by exercising for 20-30 minutes and participate in at least 1 daily pleasant/fun activities.  Status: Continued - Date(s): 09/26/2025    Intervention(s)  Therapist will provide psychoeducation, behavioral activation, and cognitive restructuring.    Objective #B  Patient will identify specific areas of cognitive distortion and challenge irrational thoughts with reality.   Status: Continued - Date(s):09/26/2025       Intervention(s)  Therapist will provide psychoeducation, behavioral activation, and cognitive restructuring.    Objective #A (Patient Action)    Patient will maintain a regular sleep and wake pattern daily.  Status: Continued - Date(s):09/26/2025    Intervention(s)  Therapist will provide psychoeducation, behavioral activation, and cognitive restructuring.    Objective #C  Patient will learn and practice relaxation techniques to manage depression.  Status: Continued - Date(s):  09/26/2025      Intervention(s)  Therapist will provide psychoeducation, behavioral activation, and cognitive restructuring.      Patient has reviewed and agreed to the above plan.      ALEX Floyd  April 3, 2024    Answers submitted by the patient for this visit:  MALVIN-7 (Submitted on 3/27/2024)  MALVIN 7 TOTAL SCORE: 12      "

## (undated) RX ORDER — LIDOCAINE HYDROCHLORIDE 20 MG/ML
JELLY TOPICAL
Status: DISPENSED
Start: 2024-04-22